# Patient Record
Sex: MALE
[De-identification: names, ages, dates, MRNs, and addresses within clinical notes are randomized per-mention and may not be internally consistent; named-entity substitution may affect disease eponyms.]

---

## 2018-03-10 ENCOUNTER — CHARTING TRANS (OUTPATIENT)
Dept: OTHER | Age: 68
End: 2018-03-10

## 2018-11-01 VITALS
TEMPERATURE: 97.7 F | DIASTOLIC BLOOD PRESSURE: 70 MMHG | SYSTOLIC BLOOD PRESSURE: 122 MMHG | OXYGEN SATURATION: 97 % | HEART RATE: 70 BPM | RESPIRATION RATE: 16 BRPM

## 2021-02-04 LAB
CHOLESTEROL: 140
HDL CHOLESTEROL: 58 MG/DL
LDL CHOLESTEROL: 68 MG/DL (ref ?–130)
TRIGLYCERIDES: 68 MG/DL

## 2021-03-25 ENCOUNTER — OFFICE VISIT (OUTPATIENT)
Dept: INTERNAL MEDICINE CLINIC | Facility: CLINIC | Age: 71
End: 2021-03-25
Payer: MEDICARE

## 2021-03-25 VITALS
HEART RATE: 89 BPM | SYSTOLIC BLOOD PRESSURE: 120 MMHG | OXYGEN SATURATION: 98 % | WEIGHT: 247.38 LBS | HEIGHT: 70.47 IN | BODY MASS INDEX: 35.02 KG/M2 | DIASTOLIC BLOOD PRESSURE: 78 MMHG

## 2021-03-25 DIAGNOSIS — I35.1 NONRHEUMATIC AORTIC VALVE INSUFFICIENCY: ICD-10-CM

## 2021-03-25 DIAGNOSIS — H61.23 BILATERAL IMPACTED CERUMEN: ICD-10-CM

## 2021-03-25 DIAGNOSIS — F41.9 ANXIETY: ICD-10-CM

## 2021-03-25 DIAGNOSIS — M1A.9XX0 CHRONIC GOUT WITHOUT TOPHUS, UNSPECIFIED CAUSE, UNSPECIFIED SITE: ICD-10-CM

## 2021-03-25 DIAGNOSIS — I71.2 THORACIC AORTIC ANEURYSM WITHOUT RUPTURE (HCC): Primary | ICD-10-CM

## 2021-03-25 PROBLEM — J38.3 VOCAL CORD DYSFUNCTION: Status: ACTIVE | Noted: 2019-10-30

## 2021-03-25 PROBLEM — M54.50 CHRONIC LOW BACK PAIN: Status: ACTIVE | Noted: 2018-04-18

## 2021-03-25 PROBLEM — I25.10 CORONARY ATHEROSCLEROSIS: Status: ACTIVE | Noted: 2019-01-28

## 2021-03-25 PROBLEM — J45.30 CONTROLLED MILD PERSISTENT ASTHMA: Status: ACTIVE | Noted: 2019-10-30

## 2021-03-25 PROBLEM — G89.29 CHRONIC LOW BACK PAIN: Status: ACTIVE | Noted: 2018-04-18

## 2021-03-25 PROBLEM — M10.9 GOUT: Status: ACTIVE | Noted: 2018-04-18

## 2021-03-25 PROBLEM — I71.20 THORACIC AORTIC ANEURYSM: Status: ACTIVE | Noted: 2019-01-28

## 2021-03-25 PROBLEM — J45.30: Status: ACTIVE | Noted: 2019-10-30

## 2021-03-25 PROBLEM — I65.29 CAROTID ARTERY STENOSIS: Status: ACTIVE | Noted: 2018-04-18

## 2021-03-25 PROBLEM — N40.0 BPH (BENIGN PROSTATIC HYPERPLASIA): Status: ACTIVE | Noted: 2018-04-18

## 2021-03-25 PROBLEM — I71.20 THORACIC AORTIC ANEURYSM (HCC): Status: ACTIVE | Noted: 2019-01-28

## 2021-03-25 PROCEDURE — 69209 REMOVE IMPACTED EAR WAX UNI: CPT | Performed by: INTERNAL MEDICINE

## 2021-03-25 PROCEDURE — 99214 OFFICE O/P EST MOD 30 MIN: CPT | Performed by: INTERNAL MEDICINE

## 2021-03-25 RX ORDER — ATORVASTATIN CALCIUM 20 MG/1
20 TABLET, FILM COATED ORAL DAILY
COMMUNITY
Start: 2020-12-31 | End: 2021-07-08

## 2021-03-25 RX ORDER — DIAZEPAM 5 MG/1
5 TABLET ORAL DAILY
COMMUNITY
Start: 2021-01-25 | End: 2021-07-08

## 2021-03-25 RX ORDER — ASPIRIN 81 MG/1
81 TABLET, CHEWABLE ORAL DAILY
COMMUNITY

## 2021-03-25 RX ORDER — DOXAZOSIN 8 MG/1
TABLET ORAL
COMMUNITY
Start: 2020-09-17 | End: 2021-03-25

## 2021-03-25 RX ORDER — ALBUTEROL SULFATE 90 UG/1
2 AEROSOL, METERED RESPIRATORY (INHALATION) EVERY 4 HOURS PRN
Qty: 1 INHALER | Refills: 6 | Status: SHIPPED | OUTPATIENT
Start: 2021-03-25 | End: 2021-07-27 | Stop reason: ALTCHOICE

## 2021-03-25 RX ORDER — ALLOPURINOL 300 MG/1
300 TABLET ORAL DAILY
COMMUNITY
Start: 2021-01-05 | End: 2021-10-18

## 2021-03-25 RX ORDER — AZELASTINE 1 MG/ML
1 SPRAY, METERED NASAL 2 TIMES DAILY
COMMUNITY
Start: 2020-11-19

## 2021-03-25 RX ORDER — DEXAMETHASONE 4 MG/1
TABLET ORAL
COMMUNITY
Start: 2021-03-19 | End: 2021-08-24 | Stop reason: ALTCHOICE

## 2021-03-25 NOTE — PROGRESS NOTES
Baron Forrest  78year old male  Patient presents with:  Ear Wax  Follow - Up: for cardiogram  .  Chief complaint is bilateral ear wax with decreased hearing aortic aneurysm, anxiety    HPI:       Is here for follow-up of multiple problems including ao are clear. He has bilateral cerumen impaction. On his left ear he has a scabbed lesion which was removed. He has a shallow ulceration. ASSESSMENT AND PLAN:   1.  Bilateral impacted cerumen  Ear wax was flushed successfully bilaterally.  - REMOVAL IMPA

## 2021-07-08 RX ORDER — DIAZEPAM 5 MG/1
5 TABLET ORAL DAILY
Qty: 30 TABLET | Refills: 3 | Status: SHIPPED | OUTPATIENT
Start: 2021-07-08 | End: 2022-01-21

## 2021-07-08 RX ORDER — ATORVASTATIN CALCIUM 20 MG/1
20 TABLET, FILM COATED ORAL DAILY
Qty: 90 TABLET | Refills: 0 | Status: SHIPPED | OUTPATIENT
Start: 2021-07-08 | End: 2021-10-11

## 2021-07-08 NOTE — TELEPHONE ENCOUNTER
Pt called requesting refills for diazepam 5 MG Oral Tab  atorvastatin 20 MG Oral Tab  Pt wants to be informed when this has been sent

## 2021-07-08 NOTE — TELEPHONE ENCOUNTER
Patient is requesting refills for atorvastatin and diazepam. I sent over refills for atorvastatin and routed diazepam to Dr. Danetta Gitelman.

## 2021-07-27 NOTE — PROGRESS NOTES
Fernando Mendez  78year old male  Patient presents with:  Ear Wax: right ear   Leg Pain: lag cramps at night (once in a while)  .           HPI:       21-year-old gentleman comes in complaining of leg cramps mostly in his calfs and thighs mostly at night use: Never     No Known Allergies   Alfuzosin HCl ER 10 MG Oral Tablet 24 Hr, Take 10 mg by mouth daily. , Disp: , Rfl:   escitalopram (LEXAPRO) 10 MG Oral Tab, Take 1 tablet (10 mg total) by mouth daily. , Disp: 90 tablet, Rfl: 2  atorvastatin 20 MG Oral Ta calcification noted. Other medical problems seems stable at the present time. He did have some dried wax in his ears which was not significant.     Visit lasted more than 30 minutes    This note was prepared using [employer] voice recognition dictat

## 2021-08-24 ENCOUNTER — OFFICE VISIT (OUTPATIENT)
Dept: INTERNAL MEDICINE CLINIC | Facility: CLINIC | Age: 71
End: 2021-08-24
Payer: MEDICARE

## 2021-08-24 VITALS
WEIGHT: 247.38 LBS | DIASTOLIC BLOOD PRESSURE: 70 MMHG | HEIGHT: 70.47 IN | TEMPERATURE: 98 F | OXYGEN SATURATION: 98 % | SYSTOLIC BLOOD PRESSURE: 130 MMHG | BODY MASS INDEX: 35.02 KG/M2 | HEART RATE: 75 BPM

## 2021-08-24 DIAGNOSIS — Z00.00 ENCOUNTER FOR ANNUAL HEALTH EXAMINATION: Primary | ICD-10-CM

## 2021-08-24 PROCEDURE — G0439 PPPS, SUBSEQ VISIT: HCPCS | Performed by: INTERNAL MEDICINE

## 2021-08-24 NOTE — PATIENT INSTRUCTIONS
Day Amin's SCREENING SCHEDULE   Tests on this list are recommended by your physician but may not be covered, or covered at this frequency, by your insurer. Please check with your insurance carrier before scheduling to verify coverage.    PREVEN PPSV23) due on 02/04/2022    Hepatitis B One screening covered for patients with certain risk factors   -  No recommendations at this time    Tetanus Toxoid Not covered by Medicare Part B unless medically necessary (cut with metal); may be covered with you need.  Screening Who needs it How often   Abdominal aortic aneurysm Men ages 72 to 76 who have ever smoked. Men in this age group who have never smoked could still be offered screening, depending on their family history or other risk factors they may have. born between [birth year] and 1963 At routine exams   High cholesterol or triglycerides All men in this age group At least every 5 years   HIV Men at increased risk for infection At routine exams   Lung cancer Men ages 54 to 76 who are in fairly good health and ar Men at increased risk for infection 1, 2, or 3 doses (depending on vaccine); ask your healthcare provider if you need a booster dose   Pneumococcal conjugate vaccine (PCV13) and pneumococcal polysaccharide vaccine (PPSV23) PPSV 23:  All men in this age [de-identified]

## 2021-08-24 NOTE — PROGRESS NOTES
HPI:   Kasie Church is a 73year old male who presents for a Medicare Subsequent Annual Wellness visit (Pt already had Initial Annual Wellness).       His last annual assessment has been over 1 year: Annual Physical due on 08/24/2022       uro Dr Nely Gan alcohol abuse.          Patient Care Team: Patient Care Team:  Aura Luis MD as PCP - General (Internal Medicine)    Patient Active Problem List:     Lumbar spondylosis     DDD (degenerative disc disease), lumbar     Aortic insufficiency     BPH (jack He has history of gout but that has not been bothering him. His BPH is stable. He has no chest pains or shortness of breath. No GI issues his last colonoscopy was about 8 years ago.   He has no polyps    EXAM:   /70 (BP Location: Right arm, Patient Balaji Bee MD, 8/24/2021     General Health     In the past six months, have you lost more than 10 pounds without trying?: 2 - No  Has your appetite been poor?: No  How does the patient maintain a good energy level?: Appropriate Exercise  How woul 12/05/2013    Colonoscopy due on 12/05/2023    Flexible Sigmoidoscopy   Covered every 4 years -    Fecal Occult Blood Test Covered annually -   Prostate Cancer Screening    Prostate-Specific Antigen (PSA) Annually No results found for: PSA  PSA Never done

## 2021-10-11 RX ORDER — ATORVASTATIN CALCIUM 20 MG/1
TABLET, FILM COATED ORAL
Qty: 90 TABLET | Refills: 0 | Status: SHIPPED | OUTPATIENT
Start: 2021-10-11 | End: 2021-11-23

## 2021-10-18 ENCOUNTER — TELEPHONE (OUTPATIENT)
Dept: INTERNAL MEDICINE CLINIC | Facility: CLINIC | Age: 71
End: 2021-10-18

## 2021-10-18 RX ORDER — ALLOPURINOL 300 MG/1
300 TABLET ORAL DAILY
Qty: 90 TABLET | Refills: 0 | Status: SHIPPED | OUTPATIENT
Start: 2021-10-18 | End: 2022-01-05

## 2021-10-18 NOTE — TELEPHONE ENCOUNTER
Patient is calling for a medication refill on the following: allopurinol 300 MG Oral Tab. Please advise.

## 2021-10-18 NOTE — TELEPHONE ENCOUNTER
Called patient in regard to refill request. Refill was sent to the pharmacy and patient was notified.

## 2021-11-23 ENCOUNTER — OFFICE VISIT (OUTPATIENT)
Dept: INTERNAL MEDICINE CLINIC | Facility: CLINIC | Age: 71
End: 2021-11-23
Payer: MEDICARE

## 2021-11-23 VITALS
OXYGEN SATURATION: 97 % | DIASTOLIC BLOOD PRESSURE: 80 MMHG | WEIGHT: 236.81 LBS | HEART RATE: 84 BPM | TEMPERATURE: 97 F | SYSTOLIC BLOOD PRESSURE: 132 MMHG | HEIGHT: 70.47 IN | BODY MASS INDEX: 33.53 KG/M2

## 2021-11-23 DIAGNOSIS — Z98.890 HISTORY OF CARDIAC RADIOFREQUENCY ABLATION: ICD-10-CM

## 2021-11-23 DIAGNOSIS — J45.20 MILD INTERMITTENT ASTHMA WITHOUT COMPLICATION: ICD-10-CM

## 2021-11-23 DIAGNOSIS — N40.0 BENIGN PROSTATIC HYPERPLASIA, UNSPECIFIED WHETHER LOWER URINARY TRACT SYMPTOMS PRESENT: ICD-10-CM

## 2021-11-23 DIAGNOSIS — I71.2 THORACIC AORTIC ANEURYSM WITHOUT RUPTURE (HCC): Primary | ICD-10-CM

## 2021-11-23 DIAGNOSIS — I35.1 NONRHEUMATIC AORTIC VALVE INSUFFICIENCY: ICD-10-CM

## 2021-11-23 DIAGNOSIS — E78.5 DYSLIPIDEMIA: ICD-10-CM

## 2021-11-23 DIAGNOSIS — Z86.79 PERSONAL HISTORY OF ATRIAL FLUTTER: ICD-10-CM

## 2021-11-23 DIAGNOSIS — M10.00 IDIOPATHIC GOUT, UNSPECIFIED CHRONICITY, UNSPECIFIED SITE: ICD-10-CM

## 2021-11-23 DIAGNOSIS — Z12.5 PROSTATE CANCER SCREENING: ICD-10-CM

## 2021-11-23 DIAGNOSIS — M1A.9XX0 CHRONIC GOUT WITHOUT TOPHUS, UNSPECIFIED CAUSE, UNSPECIFIED SITE: ICD-10-CM

## 2021-11-23 PROBLEM — J45.30 CONTROLLED MILD PERSISTENT ASTHMA: Status: RESOLVED | Noted: 2019-10-30 | Resolved: 2021-11-23

## 2021-11-23 PROBLEM — J45.30: Status: RESOLVED | Noted: 2019-10-30 | Resolved: 2021-11-23

## 2021-11-23 PROCEDURE — 99214 OFFICE O/P EST MOD 30 MIN: CPT | Performed by: INTERNAL MEDICINE

## 2021-11-23 RX ORDER — ATORVASTATIN CALCIUM 40 MG/1
40 TABLET, FILM COATED ORAL DAILY
Qty: 90 TABLET | Refills: 3 | Status: SHIPPED | OUTPATIENT
Start: 2021-11-23

## 2021-11-23 RX ORDER — ALBUTEROL SULFATE 90 UG/1
2 AEROSOL, METERED RESPIRATORY (INHALATION) EVERY 4 HOURS PRN
COMMUNITY
Start: 2021-11-13

## 2021-11-23 NOTE — PROGRESS NOTES
Shade Carroll  73year old male  Patient presents with: Follow - Up  .     Thoracic aortic aneurysm, aortic insufficiency mild intermittent asthma      HPI:       Chucho Roberts has moderate aortic insufficiency and a 4.3 cm ascending thoracic aneurysm aortic v 4 (four) hours as needed. , Disp: , Rfl:   atorvastatin 40 MG Oral Tab, Take 1 tablet (40 mg total) by mouth daily. , Disp: 90 tablet, Rfl: 3  allopurinol 300 MG Oral Tab, Take 1 tablet (300 mg total) by mouth daily. , Disp: 90 tablet, Rfl: 0  diazepam 5 MG O including PSA uric acid and cholesterol. Visit lasted more than 30 minutes    This note was prepared using [employer] voice recognition dictation software and as a result, errors may occur. When identified, these errors have been corrected.  While ev

## 2021-12-28 ENCOUNTER — OFFICE VISIT (OUTPATIENT)
Dept: INTERNAL MEDICINE CLINIC | Facility: CLINIC | Age: 71
End: 2021-12-28
Payer: MEDICARE

## 2021-12-28 VITALS
TEMPERATURE: 97 F | BODY MASS INDEX: 33.13 KG/M2 | OXYGEN SATURATION: 98 % | HEIGHT: 70.47 IN | HEART RATE: 72 BPM | DIASTOLIC BLOOD PRESSURE: 78 MMHG | SYSTOLIC BLOOD PRESSURE: 132 MMHG | WEIGHT: 234 LBS

## 2021-12-28 DIAGNOSIS — I25.10 ATHEROSCLEROSIS OF NATIVE CORONARY ARTERY OF NATIVE HEART WITHOUT ANGINA PECTORIS: ICD-10-CM

## 2021-12-28 DIAGNOSIS — M75.42 IMPINGEMENT SYNDROME OF LEFT SHOULDER: Primary | ICD-10-CM

## 2021-12-28 PROCEDURE — 99213 OFFICE O/P EST LOW 20 MIN: CPT | Performed by: INTERNAL MEDICINE

## 2021-12-28 NOTE — PROGRESS NOTES
Fernando Villalba  73year old male  Patient presents with:  Shoulder Pain: left shoulder pain x2 weeks  . HPI:       Daly Agrawal is got some left shoulder pain for the last 2 weeks he was lifting some weights when the problem began.   He has had signifi (300 mg total) by mouth daily. , Disp: 90 tablet, Rfl: 0  diazepam 5 MG Oral Tab, Take 1 tablet (5 mg total) by mouth daily. , Disp: 30 tablet, Rfl: 3  aspirin 81 MG Oral Chew Tab, Chew 81 mg by mouth daily. , Disp: , Rfl:   Azelastine HCl 0.1 % Nasal Solutio

## 2022-01-05 RX ORDER — ALLOPURINOL 300 MG/1
TABLET ORAL
Qty: 90 TABLET | Refills: 0 | Status: SHIPPED | OUTPATIENT
Start: 2022-01-05

## 2022-01-05 RX ORDER — ATORVASTATIN CALCIUM 20 MG/1
TABLET, FILM COATED ORAL
Qty: 90 TABLET | Refills: 0 | OUTPATIENT
Start: 2022-01-05

## 2022-01-21 RX ORDER — DIAZEPAM 5 MG/1
TABLET ORAL
Qty: 30 TABLET | Refills: 3 | Status: SHIPPED | OUTPATIENT
Start: 2022-01-21

## 2022-01-21 NOTE — TELEPHONE ENCOUNTER
Requested Prescriptions     Pending Prescriptions Disp Refills   • DIAZEPAM 5 MG Oral Tab [Pharmacy Med Name: DIAZEPAM 5MG TABLETS] 30 tablet 0     Sig: TAKE 1 TABLET(5 MG) BY MOUTH DAILY       Last appointment was 12/28/2021    No pending appointment sche

## 2022-04-04 ENCOUNTER — HOSPITAL ENCOUNTER (OUTPATIENT)
Dept: MRI IMAGING | Age: 72
Discharge: HOME | End: 2022-04-04
Attending: INTERNAL MEDICINE
Payer: MEDICARE

## 2022-04-04 DIAGNOSIS — M75.42 IMPINGEMENT SYNDROME OF LEFT SHOULDER: ICD-10-CM

## 2022-04-04 PROCEDURE — 73221 MRI JOINT UPR EXTREM W/O DYE: CPT | Performed by: INTERNAL MEDICINE

## 2022-04-04 RX ORDER — ALLOPURINOL 300 MG/1
TABLET ORAL
Qty: 90 TABLET | Refills: 0 | Status: SHIPPED | OUTPATIENT
Start: 2022-04-04

## 2022-04-05 ENCOUNTER — OFFICE VISIT (OUTPATIENT)
Dept: INTERNAL MEDICINE CLINIC | Facility: CLINIC | Age: 72
End: 2022-04-05
Payer: MEDICARE

## 2022-04-05 VITALS
WEIGHT: 240 LBS | DIASTOLIC BLOOD PRESSURE: 70 MMHG | HEIGHT: 70.47 IN | BODY MASS INDEX: 33.98 KG/M2 | HEART RATE: 82 BPM | OXYGEN SATURATION: 98 % | SYSTOLIC BLOOD PRESSURE: 116 MMHG | TEMPERATURE: 98 F

## 2022-04-05 DIAGNOSIS — M75.82 TENDINITIS OF LEFT ROTATOR CUFF: Primary | ICD-10-CM

## 2022-04-05 DIAGNOSIS — I71.2 THORACIC AORTIC ANEURYSM WITHOUT RUPTURE (HCC): ICD-10-CM

## 2022-04-05 DIAGNOSIS — M1A.9XX0 CHRONIC GOUT WITHOUT TOPHUS, UNSPECIFIED CAUSE, UNSPECIFIED SITE: ICD-10-CM

## 2022-04-05 DIAGNOSIS — M79.10 MYALGIA: ICD-10-CM

## 2022-04-05 DIAGNOSIS — E78.5 DYSLIPIDEMIA: ICD-10-CM

## 2022-04-05 DIAGNOSIS — U07.1 COVID-19 VIRUS INFECTION: ICD-10-CM

## 2022-04-05 PROCEDURE — 99213 OFFICE O/P EST LOW 20 MIN: CPT | Performed by: INTERNAL MEDICINE

## 2022-04-05 RX ORDER — DOXAZOSIN 8 MG/1
8 TABLET ORAL NIGHTLY
Qty: 90 TABLET | Refills: 3 | Status: SHIPPED | OUTPATIENT
Start: 2022-04-05

## 2022-04-08 LAB
ALBUMIN/GLOBULIN RATIO: 1.4 (CALC) (ref 1–2.5)
ALBUMIN: 4.3 G/DL (ref 3.6–5.1)
ALKALINE PHOSPHATASE: 65 U/L (ref 35–144)
ALT: 35 U/L (ref 9–46)
AST: 35 U/L (ref 10–35)
BILIRUBIN, TOTAL: 1 MG/DL (ref 0.2–1.2)
BUN: 19 MG/DL (ref 7–25)
CALCIUM: 9.6 MG/DL (ref 8.6–10.3)
CARBON DIOXIDE: 30 MMOL/L (ref 20–32)
CHLORIDE: 103 MMOL/L (ref 98–110)
CREATININE: 0.97 MG/DL (ref 0.7–1.25)
EGFR IF AFRICN AM: 92 ML/MIN/1.73M2
EGFR IF NONAFRICN AM: 79 ML/MIN/1.73M2
GLOBULIN: 3.1 G/DL (CALC) (ref 1.9–3.7)
GLUCOSE: 86 MG/DL (ref 65–99)
HEMATOCRIT: 47.5 % (ref 38.5–50)
HEMOGLOBIN: 16 G/DL (ref 13.2–17.1)
MCH: 29.7 PG (ref 27–33)
MCHC: 33.7 G/DL (ref 32–36)
MCV: 88.1 FL (ref 80–100)
MPV: 12.1 FL (ref 7.5–12.5)
PLATELET COUNT: 156 THOUSAND/UL (ref 140–400)
POTASSIUM: 4.7 MMOL/L (ref 3.5–5.3)
PROTEIN, TOTAL: 7.4 G/DL (ref 6.1–8.1)
RDW: 14 % (ref 11–15)
RED BLOOD CELL COUNT: 5.39 MILLION/UL (ref 4.2–5.8)
SARS COV 2 AB IGG: POSITIVE
SED RATE BY MODIFIED$WESTERGREN: 9 MM/H
SODIUM: 140 MMOL/L (ref 135–146)
TSH W/REFLEX TO FT4: 1.29 MIU/L (ref 0.4–4.5)
WHITE BLOOD CELL COUNT: 5.3 THOUSAND/UL (ref 3.8–10.8)

## 2022-05-05 ENCOUNTER — HOSPITAL ENCOUNTER (OUTPATIENT)
Dept: CT IMAGING | Facility: HOSPITAL | Age: 72
Discharge: HOME | End: 2022-05-05
Attending: INTERNAL MEDICINE
Payer: MEDICARE

## 2022-05-05 DIAGNOSIS — I71.2 THORACIC AORTIC ANEURYSM WITHOUT RUPTURE (HCC): ICD-10-CM

## 2022-05-05 PROCEDURE — 71260 CT THORAX DX C+: CPT | Performed by: INTERNAL MEDICINE

## 2022-06-06 LAB — AMB EXT PSA SCREEN: 0.79 NG/ML

## 2022-06-13 RX ORDER — ALLOPURINOL 300 MG/1
TABLET ORAL
Qty: 90 TABLET | Refills: 0 | Status: SHIPPED | OUTPATIENT
Start: 2022-06-13

## 2022-08-07 RX ORDER — DIAZEPAM 5 MG/1
5 TABLET ORAL DAILY
Qty: 30 TABLET | Refills: 2 | Status: SHIPPED | OUTPATIENT
Start: 2022-08-07 | End: 2023-04-03

## 2022-09-13 ENCOUNTER — OFFICE VISIT (OUTPATIENT)
Dept: INTERNAL MEDICINE CLINIC | Facility: CLINIC | Age: 72
End: 2022-09-13
Payer: MEDICARE

## 2022-09-13 VITALS
TEMPERATURE: 97 F | DIASTOLIC BLOOD PRESSURE: 60 MMHG | OXYGEN SATURATION: 96 % | HEART RATE: 76 BPM | SYSTOLIC BLOOD PRESSURE: 98 MMHG | WEIGHT: 237 LBS | HEIGHT: 70 IN | BODY MASS INDEX: 33.93 KG/M2

## 2022-09-13 DIAGNOSIS — I71.2 THORACIC AORTIC ANEURYSM WITHOUT RUPTURE (HCC): ICD-10-CM

## 2022-09-13 DIAGNOSIS — Z98.890 HISTORY OF CARDIAC RADIOFREQUENCY ABLATION: ICD-10-CM

## 2022-09-13 DIAGNOSIS — M1A.9XX0 CHRONIC GOUT WITHOUT TOPHUS, UNSPECIFIED CAUSE, UNSPECIFIED SITE: ICD-10-CM

## 2022-09-13 DIAGNOSIS — Z86.79 PERSONAL HISTORY OF ATRIAL FLUTTER: ICD-10-CM

## 2022-09-13 DIAGNOSIS — J45.20 MILD INTERMITTENT ASTHMA WITHOUT COMPLICATION: ICD-10-CM

## 2022-09-13 DIAGNOSIS — N40.0 BENIGN PROSTATIC HYPERPLASIA, UNSPECIFIED WHETHER LOWER URINARY TRACT SYMPTOMS PRESENT: ICD-10-CM

## 2022-09-13 DIAGNOSIS — I65.23 BILATERAL CAROTID ARTERY STENOSIS: ICD-10-CM

## 2022-09-13 DIAGNOSIS — R25.2 LEG CRAMPS: ICD-10-CM

## 2022-09-13 DIAGNOSIS — I25.10 ATHEROSCLEROSIS OF NATIVE CORONARY ARTERY OF NATIVE HEART WITHOUT ANGINA PECTORIS: ICD-10-CM

## 2022-09-13 DIAGNOSIS — E78.5 DYSLIPIDEMIA: Primary | ICD-10-CM

## 2022-09-13 DIAGNOSIS — I35.1 NONRHEUMATIC AORTIC VALVE INSUFFICIENCY: ICD-10-CM

## 2022-09-13 PROBLEM — J38.3 VOCAL CORD DYSFUNCTION: Status: RESOLVED | Noted: 2019-10-30 | Resolved: 2022-09-13

## 2022-09-13 LAB
ALBUMIN SERPL-MCNC: 3.3 G/DL (ref 3.4–5)
ALBUMIN/GLOB SERPL: 0.9 {RATIO} (ref 1–2)
ALP LIVER SERPL-CCNC: 62 U/L
ALT SERPL-CCNC: 37 U/L
ANION GAP SERPL CALC-SCNC: 6 MMOL/L (ref 0–18)
AST SERPL-CCNC: 32 U/L (ref 15–37)
BASOPHILS # BLD AUTO: 0.02 X10(3) UL (ref 0–0.2)
BASOPHILS NFR BLD AUTO: 0.4 %
BILIRUB SERPL-MCNC: 0.7 MG/DL (ref 0.1–2)
BUN BLD-MCNC: 26 MG/DL (ref 7–18)
BUN/CREAT SERPL: 28.6 (ref 10–20)
CALCIUM BLD-MCNC: 8.6 MG/DL (ref 8.5–10.1)
CHLORIDE SERPL-SCNC: 105 MMOL/L (ref 98–112)
CHOLEST SERPL-MCNC: 204 MG/DL (ref ?–200)
CO2 SERPL-SCNC: 28 MMOL/L (ref 21–32)
CREAT BLD-MCNC: 0.91 MG/DL
DEPRECATED RDW RBC AUTO: 53.1 FL (ref 35.1–46.3)
EOSINOPHIL # BLD AUTO: 0.09 X10(3) UL (ref 0–0.7)
EOSINOPHIL NFR BLD AUTO: 1.8 %
ERYTHROCYTE [DISTWIDTH] IN BLOOD BY AUTOMATED COUNT: 15.2 % (ref 11–15)
FASTING PATIENT LIPID ANSWER: NO
FASTING STATUS PATIENT QL REPORTED: NO
GFR SERPLBLD BASED ON 1.73 SQ M-ARVRAT: 91 ML/MIN/1.73M2 (ref 60–?)
GLOBULIN PLAS-MCNC: 3.6 G/DL (ref 2.8–4.4)
GLUCOSE BLD-MCNC: 78 MG/DL (ref 70–99)
HCT VFR BLD AUTO: 44.5 %
HDLC SERPL-MCNC: 70 MG/DL (ref 40–59)
HGB BLD-MCNC: 14.8 G/DL
IMM GRANULOCYTES # BLD AUTO: 0.01 X10(3) UL (ref 0–1)
IMM GRANULOCYTES NFR BLD: 0.2 %
LDLC SERPL CALC-MCNC: 107 MG/DL (ref ?–100)
LYMPHOCYTES # BLD AUTO: 1.7 X10(3) UL (ref 1–4)
LYMPHOCYTES NFR BLD AUTO: 34.1 %
MCH RBC QN AUTO: 32 PG (ref 26–34)
MCHC RBC AUTO-ENTMCNC: 33.3 G/DL (ref 31–37)
MCV RBC AUTO: 96.3 FL
MONOCYTES # BLD AUTO: 0.53 X10(3) UL (ref 0.1–1)
MONOCYTES NFR BLD AUTO: 10.6 %
NEUTROPHILS # BLD AUTO: 2.63 X10 (3) UL (ref 1.5–7.7)
NEUTROPHILS # BLD AUTO: 2.63 X10(3) UL (ref 1.5–7.7)
NEUTROPHILS NFR BLD AUTO: 52.9 %
NONHDLC SERPL-MCNC: 134 MG/DL (ref ?–130)
OSMOLALITY SERPL CALC.SUM OF ELEC: 292 MOSM/KG (ref 275–295)
PLATELET # BLD AUTO: 176 10(3)UL (ref 150–450)
POTASSIUM SERPL-SCNC: 3.7 MMOL/L (ref 3.5–5.1)
PROT SERPL-MCNC: 6.9 G/DL (ref 6.4–8.2)
RBC # BLD AUTO: 4.62 X10(6)UL
SODIUM SERPL-SCNC: 139 MMOL/L (ref 136–145)
TRIGL SERPL-MCNC: 160 MG/DL (ref 30–149)
URATE SERPL-MCNC: 5 MG/DL
VLDLC SERPL CALC-MCNC: 27 MG/DL (ref 0–30)
WBC # BLD AUTO: 5 X10(3) UL (ref 4–11)

## 2022-09-13 PROCEDURE — 80061 LIPID PANEL: CPT | Performed by: INTERNAL MEDICINE

## 2022-09-13 PROCEDURE — 80053 COMPREHEN METABOLIC PANEL: CPT | Performed by: INTERNAL MEDICINE

## 2022-09-13 PROCEDURE — 85025 COMPLETE CBC W/AUTO DIFF WBC: CPT | Performed by: INTERNAL MEDICINE

## 2022-09-13 PROCEDURE — 84550 ASSAY OF BLOOD/URIC ACID: CPT | Performed by: INTERNAL MEDICINE

## 2022-09-13 RX ORDER — DOXAZOSIN 8 MG/1
12 TABLET ORAL NIGHTLY
Qty: 135 TABLET | Refills: 3 | Status: SHIPPED | OUTPATIENT
Start: 2022-09-13

## 2022-09-13 RX ORDER — AZELASTINE 1 MG/ML
1 SPRAY, METERED NASAL 2 TIMES DAILY
Qty: 30 ML | Refills: 2 | Status: SHIPPED | OUTPATIENT
Start: 2022-09-13

## 2022-09-13 RX ORDER — PRAVASTATIN SODIUM 40 MG
40 TABLET ORAL NIGHTLY
Qty: 90 TABLET | Refills: 3 | Status: SHIPPED | OUTPATIENT
Start: 2022-09-13

## 2022-09-13 RX ORDER — ALBUTEROL SULFATE 90 UG/1
2 AEROSOL, METERED RESPIRATORY (INHALATION) EVERY 4 HOURS PRN
Qty: 1 EACH | Refills: 1 | Status: SHIPPED | OUTPATIENT
Start: 2022-09-13

## 2022-09-13 RX ORDER — ALLOPURINOL 300 MG/1
300 TABLET ORAL DAILY
Qty: 90 TABLET | Refills: 3 | Status: SHIPPED | OUTPATIENT
Start: 2022-09-13

## 2022-11-02 ENCOUNTER — OFFICE VISIT (OUTPATIENT)
Dept: INTERNAL MEDICINE CLINIC | Facility: CLINIC | Age: 72
End: 2022-11-02
Payer: MEDICARE

## 2022-11-02 VITALS
OXYGEN SATURATION: 99 % | DIASTOLIC BLOOD PRESSURE: 78 MMHG | TEMPERATURE: 97 F | WEIGHT: 238 LBS | SYSTOLIC BLOOD PRESSURE: 130 MMHG | BODY MASS INDEX: 34 KG/M2 | HEART RATE: 88 BPM

## 2022-11-02 DIAGNOSIS — T50.Z95A ARTHRALGIA AFTER VACCINATION: ICD-10-CM

## 2022-11-02 DIAGNOSIS — Z86.79 PERSONAL HISTORY OF ATRIAL FLUTTER: ICD-10-CM

## 2022-11-02 DIAGNOSIS — N40.1 BENIGN PROSTATIC HYPERPLASIA WITH NOCTURIA: ICD-10-CM

## 2022-11-02 DIAGNOSIS — I65.23 BILATERAL CAROTID ARTERY STENOSIS: ICD-10-CM

## 2022-11-02 DIAGNOSIS — J45.20 MILD INTERMITTENT ASTHMA WITHOUT COMPLICATION: ICD-10-CM

## 2022-11-02 DIAGNOSIS — Z98.890 HISTORY OF CARDIAC RADIOFREQUENCY ABLATION: ICD-10-CM

## 2022-11-02 DIAGNOSIS — R31.0 GROSS HEMATURIA: Primary | ICD-10-CM

## 2022-11-02 DIAGNOSIS — R35.1 BENIGN PROSTATIC HYPERPLASIA WITH NOCTURIA: ICD-10-CM

## 2022-11-02 DIAGNOSIS — I35.1 NONRHEUMATIC AORTIC VALVE INSUFFICIENCY: ICD-10-CM

## 2022-11-02 DIAGNOSIS — M25.50 ARTHRALGIA AFTER VACCINATION: ICD-10-CM

## 2022-11-02 PROCEDURE — 99214 OFFICE O/P EST MOD 30 MIN: CPT | Performed by: INTERNAL MEDICINE

## 2022-11-02 RX ORDER — SILDENAFIL CITRATE 20 MG/1
TABLET ORAL
COMMUNITY
Start: 2022-09-14

## 2023-02-02 ENCOUNTER — OFFICE VISIT (OUTPATIENT)
Dept: INTERNAL MEDICINE CLINIC | Facility: CLINIC | Age: 73
End: 2023-02-02
Payer: MEDICARE

## 2023-02-02 VITALS
BODY MASS INDEX: 33.36 KG/M2 | DIASTOLIC BLOOD PRESSURE: 68 MMHG | HEART RATE: 82 BPM | HEIGHT: 70 IN | OXYGEN SATURATION: 95 % | SYSTOLIC BLOOD PRESSURE: 118 MMHG | WEIGHT: 233 LBS

## 2023-02-02 DIAGNOSIS — N40.1 BENIGN PROSTATIC HYPERPLASIA WITH NOCTURIA: ICD-10-CM

## 2023-02-02 DIAGNOSIS — R31.0 GROSS HEMATURIA: Primary | ICD-10-CM

## 2023-02-02 DIAGNOSIS — H61.20 CERUMEN IN AUDITORY CANAL ON EXAMINATION: ICD-10-CM

## 2023-02-02 DIAGNOSIS — F41.9 ANXIETY: ICD-10-CM

## 2023-02-02 DIAGNOSIS — M10.00 IDIOPATHIC GOUT, UNSPECIFIED CHRONICITY, UNSPECIFIED SITE: ICD-10-CM

## 2023-02-02 DIAGNOSIS — R35.1 BENIGN PROSTATIC HYPERPLASIA WITH NOCTURIA: ICD-10-CM

## 2023-02-02 DIAGNOSIS — M25.511 CHRONIC RIGHT SHOULDER PAIN: ICD-10-CM

## 2023-02-02 DIAGNOSIS — G89.29 CHRONIC RIGHT SHOULDER PAIN: ICD-10-CM

## 2023-02-02 DIAGNOSIS — J45.20 MILD INTERMITTENT ASTHMA WITHOUT COMPLICATION: ICD-10-CM

## 2023-02-02 PROCEDURE — 99214 OFFICE O/P EST MOD 30 MIN: CPT | Performed by: INTERNAL MEDICINE

## 2023-03-10 ENCOUNTER — TELEPHONE (OUTPATIENT)
Dept: INTERNAL MEDICINE CLINIC | Facility: CLINIC | Age: 73
End: 2023-03-10

## 2023-03-10 DIAGNOSIS — G89.29 CHRONIC RIGHT SHOULDER PAIN: ICD-10-CM

## 2023-03-10 DIAGNOSIS — M25.511 ACUTE PAIN OF RIGHT SHOULDER: Primary | ICD-10-CM

## 2023-03-10 DIAGNOSIS — M25.511 CHRONIC RIGHT SHOULDER PAIN: ICD-10-CM

## 2023-03-10 NOTE — TELEPHONE ENCOUNTER
To Dr. Kaitlyn Keenan--    RN noted R shoulder pain was assessed at [address] [place] [address] in feb. Order pended if MD agreeable.

## 2023-03-10 NOTE — TELEPHONE ENCOUNTER
Pt is calling requesting a mri order for right shoulder. Pt states has been having pain. Pt at first thought he had it done before but mri was for left shoulder.        Please call and advise

## 2023-03-10 NOTE — TELEPHONE ENCOUNTER
MRI not covered with diagnosis of acute or chronic pain. Recommend to see orthopedics so he does not end up with a big bill.

## 2023-06-02 ENCOUNTER — OFFICE VISIT (OUTPATIENT)
Dept: INTERNAL MEDICINE CLINIC | Facility: CLINIC | Age: 73
End: 2023-06-02
Payer: MEDICARE

## 2023-06-02 VITALS
OXYGEN SATURATION: 96 % | SYSTOLIC BLOOD PRESSURE: 116 MMHG | WEIGHT: 242 LBS | HEART RATE: 89 BPM | DIASTOLIC BLOOD PRESSURE: 78 MMHG | TEMPERATURE: 98 F | BODY MASS INDEX: 35 KG/M2

## 2023-06-02 DIAGNOSIS — G89.29 CHRONIC LEFT SHOULDER PAIN: ICD-10-CM

## 2023-06-02 DIAGNOSIS — E78.5 DYSLIPIDEMIA: ICD-10-CM

## 2023-06-02 DIAGNOSIS — R35.1 BENIGN PROSTATIC HYPERPLASIA WITH NOCTURIA: ICD-10-CM

## 2023-06-02 DIAGNOSIS — R53.83 FATIGUE, UNSPECIFIED TYPE: ICD-10-CM

## 2023-06-02 DIAGNOSIS — M25.512 CHRONIC LEFT SHOULDER PAIN: ICD-10-CM

## 2023-06-02 DIAGNOSIS — M10.00 IDIOPATHIC GOUT, UNSPECIFIED CHRONICITY, UNSPECIFIED SITE: ICD-10-CM

## 2023-06-02 DIAGNOSIS — Z12.5 PROSTATE CANCER SCREENING: ICD-10-CM

## 2023-06-02 DIAGNOSIS — N40.1 BENIGN PROSTATIC HYPERPLASIA WITH NOCTURIA: ICD-10-CM

## 2023-06-02 DIAGNOSIS — Z01.818 PRE-OPERATIVE CLEARANCE: Primary | ICD-10-CM

## 2023-06-02 PROCEDURE — 99214 OFFICE O/P EST MOD 30 MIN: CPT | Performed by: INTERNAL MEDICINE

## 2023-06-03 PROBLEM — I25.10 CORONARY ATHEROSCLEROSIS: Status: RESOLVED | Noted: 2019-01-28 | Resolved: 2023-06-03

## 2023-06-06 ENCOUNTER — LAB ENCOUNTER (OUTPATIENT)
Dept: LAB | Facility: HOSPITAL | Age: 73
End: 2023-06-06
Attending: INTERNAL MEDICINE
Payer: MEDICARE

## 2023-06-06 DIAGNOSIS — G89.29 OTHER CHRONIC PAIN: ICD-10-CM

## 2023-06-06 DIAGNOSIS — M25.512 CHRONIC LEFT SHOULDER PAIN: ICD-10-CM

## 2023-06-06 DIAGNOSIS — Z01.818 PREOPERATIVE EXAMINATION, UNSPECIFIED: Primary | ICD-10-CM

## 2023-06-06 DIAGNOSIS — G89.29 CHRONIC LEFT SHOULDER PAIN: ICD-10-CM

## 2023-06-06 DIAGNOSIS — M25.512 LEFT SHOULDER PAIN: ICD-10-CM

## 2023-06-06 DIAGNOSIS — R53.83 FATIGUE, UNSPECIFIED TYPE: ICD-10-CM

## 2023-06-06 LAB
ALBUMIN SERPL-MCNC: 3.7 G/DL (ref 3.4–5)
ALBUMIN/GLOB SERPL: 0.9 {RATIO} (ref 1–2)
ALP LIVER SERPL-CCNC: 63 U/L
ALT SERPL-CCNC: 32 U/L
ANION GAP SERPL CALC-SCNC: 0 MMOL/L (ref 0–18)
AST SERPL-CCNC: 36 U/L (ref 15–37)
ATRIAL RATE: 66 BPM
BASOPHILS # BLD AUTO: 0.03 X10(3) UL (ref 0–0.2)
BASOPHILS NFR BLD AUTO: 0.6 %
BILIRUB SERPL-MCNC: 0.9 MG/DL (ref 0.1–2)
BILIRUB UR QL: NEGATIVE
BUN BLD-MCNC: 18 MG/DL (ref 7–18)
BUN/CREAT SERPL: 18.6 (ref 10–20)
CALCIUM BLD-MCNC: 9 MG/DL (ref 8.5–10.1)
CHLORIDE SERPL-SCNC: 107 MMOL/L (ref 98–112)
CHOLEST SERPL-MCNC: 191 MG/DL (ref ?–200)
CLARITY UR: CLEAR
CO2 SERPL-SCNC: 30 MMOL/L (ref 21–32)
COMPLEXED PSA SERPL-MCNC: 0.85 NG/ML (ref ?–4)
CREAT BLD-MCNC: 0.97 MG/DL
DEPRECATED RDW RBC AUTO: 47.1 FL (ref 35.1–46.3)
EOSINOPHIL # BLD AUTO: 0.34 X10(3) UL (ref 0–0.7)
EOSINOPHIL NFR BLD AUTO: 7 %
ERYTHROCYTE [DISTWIDTH] IN BLOOD BY AUTOMATED COUNT: 14 % (ref 11–15)
FASTING PATIENT LIPID ANSWER: YES
FASTING STATUS PATIENT QL REPORTED: YES
GFR SERPLBLD BASED ON 1.73 SQ M-ARVRAT: 84 ML/MIN/1.73M2 (ref 60–?)
GLOBULIN PLAS-MCNC: 4.3 G/DL (ref 2.8–4.4)
GLUCOSE BLD-MCNC: 98 MG/DL (ref 70–99)
GLUCOSE UR-MCNC: NORMAL MG/DL
HCT VFR BLD AUTO: 48.4 %
HDLC SERPL-MCNC: 65 MG/DL (ref 40–59)
HGB BLD-MCNC: 16.2 G/DL
HGB UR QL STRIP.AUTO: NEGATIVE
IMM GRANULOCYTES # BLD AUTO: 0.01 X10(3) UL (ref 0–1)
IMM GRANULOCYTES NFR BLD: 0.2 %
KETONES UR-MCNC: NEGATIVE MG/DL
LDLC SERPL CALC-MCNC: 115 MG/DL (ref ?–100)
LEUKOCYTE ESTERASE UR QL STRIP.AUTO: NEGATIVE
LYMPHOCYTES # BLD AUTO: 2.02 X10(3) UL (ref 1–4)
LYMPHOCYTES NFR BLD AUTO: 41.6 %
MCH RBC QN AUTO: 31 PG (ref 26–34)
MCHC RBC AUTO-ENTMCNC: 33.5 G/DL (ref 31–37)
MCV RBC AUTO: 92.7 FL
MONOCYTES # BLD AUTO: 0.53 X10(3) UL (ref 0.1–1)
MONOCYTES NFR BLD AUTO: 10.9 %
NEUTROPHILS # BLD AUTO: 1.93 X10 (3) UL (ref 1.5–7.7)
NEUTROPHILS # BLD AUTO: 1.93 X10(3) UL (ref 1.5–7.7)
NEUTROPHILS NFR BLD AUTO: 39.7 %
NITRITE UR QL STRIP.AUTO: NEGATIVE
NONHDLC SERPL-MCNC: 126 MG/DL (ref ?–130)
OSMOLALITY SERPL CALC.SUM OF ELEC: 286 MOSM/KG (ref 275–295)
P AXIS: 13 DEGREES
P-R INTERVAL: 132 MS
PH UR: 6.5 [PH] (ref 5–8)
PLATELET # BLD AUTO: 177 10(3)UL (ref 150–450)
POTASSIUM SERPL-SCNC: 4 MMOL/L (ref 3.5–5.1)
PROT SERPL-MCNC: 8 G/DL (ref 6.4–8.2)
PROT UR-MCNC: NEGATIVE MG/DL
Q-T INTERVAL: 418 MS
QRS DURATION: 90 MS
QTC CALCULATION (BEZET): 438 MS
R AXIS: -27 DEGREES
RBC # BLD AUTO: 5.22 X10(6)UL
RHEUMATOID FACT SERPL-ACNC: <10 IU/ML (ref ?–15)
SODIUM SERPL-SCNC: 137 MMOL/L (ref 136–145)
SP GR UR STRIP: 1.02 (ref 1–1.03)
T AXIS: 29 DEGREES
TRIGL SERPL-MCNC: 56 MG/DL (ref 30–149)
URATE SERPL-MCNC: 6.4 MG/DL
UROBILINOGEN UR STRIP-ACNC: NORMAL
VENTRICULAR RATE: 66 BPM
VLDLC SERPL CALC-MCNC: 10 MG/DL (ref 0–30)
WBC # BLD AUTO: 4.9 X10(3) UL (ref 4–11)

## 2023-06-06 PROCEDURE — 93005 ELECTROCARDIOGRAM TRACING: CPT

## 2023-06-06 PROCEDURE — 36415 COLL VENOUS BLD VENIPUNCTURE: CPT | Performed by: INTERNAL MEDICINE

## 2023-06-06 PROCEDURE — 85025 COMPLETE CBC W/AUTO DIFF WBC: CPT | Performed by: INTERNAL MEDICINE

## 2023-06-06 PROCEDURE — 93010 ELECTROCARDIOGRAM REPORT: CPT | Performed by: STUDENT IN AN ORGANIZED HEALTH CARE EDUCATION/TRAINING PROGRAM

## 2023-06-06 PROCEDURE — 80061 LIPID PANEL: CPT | Performed by: INTERNAL MEDICINE

## 2023-06-06 PROCEDURE — 84550 ASSAY OF BLOOD/URIC ACID: CPT | Performed by: INTERNAL MEDICINE

## 2023-06-06 PROCEDURE — 86431 RHEUMATOID FACTOR QUANT: CPT

## 2023-06-06 PROCEDURE — 80053 COMPREHEN METABOLIC PANEL: CPT | Performed by: INTERNAL MEDICINE

## 2023-09-05 RX ORDER — ALBUTEROL SULFATE 90 UG/1
2 AEROSOL, METERED RESPIRATORY (INHALATION) EVERY 4 HOURS PRN
Qty: 8.5 G | Refills: 0 | Status: SHIPPED | OUTPATIENT
Start: 2023-09-05

## 2023-09-26 ENCOUNTER — TELEPHONE (OUTPATIENT)
Dept: INTERNAL MEDICINE CLINIC | Facility: CLINIC | Age: 73
End: 2023-09-26

## 2023-10-17 RX ORDER — DIAZEPAM 5 MG/1
5 TABLET ORAL DAILY
Qty: 30 TABLET | Refills: 2 | Status: SHIPPED | OUTPATIENT
Start: 2023-10-17

## 2023-10-17 NOTE — TELEPHONE ENCOUNTER
Requested Prescriptions     Pending Prescriptions Disp Refills    DIAZEPAM 5 MG Oral Tab [Pharmacy Med Name: DIAZEPAM 5MG TABLETS] 30 tablet 0     Sig: TAKE 1 TABLET(5 MG) BY MOUTH DAILY     LAST REFILL DATE 4/3/23   QUANTITY REQUESTED    DAY SUPPLY    DIAGNOSIS    LAST OFFICE VISIT  6/2/23   FOLLOW UP DUE    No future appointments.

## 2023-11-20 RX ORDER — ALLOPURINOL 300 MG/1
300 TABLET ORAL DAILY
Qty: 90 TABLET | Refills: 0 | Status: SHIPPED | OUTPATIENT
Start: 2023-11-20

## 2024-01-19 RX ORDER — DOXAZOSIN 8 MG/1
TABLET ORAL
Qty: 135 TABLET | Refills: 3 | OUTPATIENT
Start: 2024-01-19

## 2024-01-21 ENCOUNTER — MOBILE ENCOUNTER (OUTPATIENT)
Dept: INTERNAL MEDICINE CLINIC | Facility: CLINIC | Age: 74
End: 2024-01-21

## 2024-01-21 RX ORDER — DOXAZOSIN 8 MG/1
12 TABLET ORAL NIGHTLY
Qty: 45 TABLET | Refills: 8 | Status: SHIPPED | OUTPATIENT
Start: 2024-01-21 | End: 2024-10-17

## 2024-02-19 RX ORDER — ALLOPURINOL 300 MG/1
300 TABLET ORAL DAILY
Qty: 90 TABLET | Refills: 0 | OUTPATIENT
Start: 2024-02-19

## 2024-02-19 RX ORDER — ALLOPURINOL 300 MG/1
300 TABLET ORAL DAILY
Qty: 90 TABLET | Refills: 2 | Status: SHIPPED | OUTPATIENT
Start: 2024-02-19

## 2024-04-01 RX ORDER — ALBUTEROL SULFATE 90 UG/1
2 AEROSOL, METERED RESPIRATORY (INHALATION) EVERY 4 HOURS PRN
Qty: 8.5 G | Refills: 0 | OUTPATIENT
Start: 2024-04-01

## 2024-05-16 RX ORDER — DIAZEPAM 5 MG/1
5 TABLET ORAL DAILY
Qty: 30 TABLET | Refills: 0 | OUTPATIENT
Start: 2024-05-16

## 2024-05-30 ENCOUNTER — OFFICE VISIT (OUTPATIENT)
Dept: INTERNAL MEDICINE CLINIC | Facility: CLINIC | Age: 74
End: 2024-05-30

## 2024-05-30 VITALS
OXYGEN SATURATION: 95 % | SYSTOLIC BLOOD PRESSURE: 120 MMHG | HEART RATE: 70 BPM | WEIGHT: 227 LBS | BODY MASS INDEX: 32.5 KG/M2 | HEIGHT: 70 IN | DIASTOLIC BLOOD PRESSURE: 60 MMHG

## 2024-05-30 DIAGNOSIS — M51.36 DDD (DEGENERATIVE DISC DISEASE), LUMBAR: Primary | ICD-10-CM

## 2024-05-30 DIAGNOSIS — I65.23 BILATERAL CAROTID ARTERY STENOSIS: ICD-10-CM

## 2024-05-30 DIAGNOSIS — J45.20 MILD INTERMITTENT ASTHMA WITHOUT COMPLICATION (HCC): ICD-10-CM

## 2024-05-30 DIAGNOSIS — F41.9 ANXIETY: ICD-10-CM

## 2024-05-30 DIAGNOSIS — G89.29 CHRONIC RIGHT SHOULDER PAIN: ICD-10-CM

## 2024-05-30 DIAGNOSIS — M25.511 CHRONIC RIGHT SHOULDER PAIN: ICD-10-CM

## 2024-05-30 DIAGNOSIS — R06.09 DOE (DYSPNEA ON EXERTION): ICD-10-CM

## 2024-05-30 DIAGNOSIS — E78.5 DYSLIPIDEMIA: ICD-10-CM

## 2024-05-30 DIAGNOSIS — Z86.79 PERSONAL HISTORY OF ATRIAL FLUTTER: ICD-10-CM

## 2024-05-30 DIAGNOSIS — Z98.890 HISTORY OF CARDIAC RADIOFREQUENCY ABLATION: ICD-10-CM

## 2024-05-30 DIAGNOSIS — R68.82 LOW LIBIDO: ICD-10-CM

## 2024-05-30 DIAGNOSIS — I25.10 CORONARY ARTERY DISEASE DUE TO CALCIFIED CORONARY LESION: ICD-10-CM

## 2024-05-30 DIAGNOSIS — I71.21 ANEURYSM OF ASCENDING AORTA WITHOUT RUPTURE (HCC): ICD-10-CM

## 2024-05-30 DIAGNOSIS — N40.1 BENIGN PROSTATIC HYPERPLASIA WITH NOCTURIA: ICD-10-CM

## 2024-05-30 DIAGNOSIS — I25.84 CORONARY ARTERY DISEASE DUE TO CALCIFIED CORONARY LESION: ICD-10-CM

## 2024-05-30 DIAGNOSIS — R35.1 BENIGN PROSTATIC HYPERPLASIA WITH NOCTURIA: ICD-10-CM

## 2024-05-30 DIAGNOSIS — Z87.448 HISTORY OF HEMATURIA: ICD-10-CM

## 2024-05-30 DIAGNOSIS — M10.00 IDIOPATHIC GOUT, UNSPECIFIED CHRONICITY, UNSPECIFIED SITE: ICD-10-CM

## 2024-05-30 DIAGNOSIS — I35.1 NONRHEUMATIC AORTIC VALVE INSUFFICIENCY: ICD-10-CM

## 2024-05-30 PROCEDURE — G0439 PPPS, SUBSEQ VISIT: HCPCS | Performed by: INTERNAL MEDICINE

## 2024-05-30 PROCEDURE — 99213 OFFICE O/P EST LOW 20 MIN: CPT | Performed by: INTERNAL MEDICINE

## 2024-05-30 RX ORDER — DIAZEPAM 5 MG/1
5 TABLET ORAL DAILY
Qty: 30 TABLET | Refills: 2 | Status: SHIPPED | OUTPATIENT
Start: 2024-05-30

## 2024-05-30 NOTE — PROGRESS NOTES
Jim Amin is a 73 year old  who presents for a Medicare Subsequent Annual Wellness visit (Pt already had Initial Annual Wellness)    Discussed medicare wellness , reviewed assessments and health maintenance for screening and immunizations.    In addition medical issues  addressed today included ; we discussed his chronic back pain.  Seems to be stable has not done any chiropractic work in a while.  Still likes to swim.  He has aortic insufficiency but has not had recent echocardiogram has no symptoms.  His BPH is stable.  He did have some hematuria and had a negative workup.  Has history of bilateral carotid stenosis no symptoms.  No recent imaging.  Should be noted he is not taking a statin.  Discussed several reasons why he should take a statin including carotid stenosis as well as high calcium score.  He had a CT angiogram in the past which showed no significant obstructive coronary disease.  He also has an aneurysm but has not had recent imaging.  Last reported at 4.3 cm.  History of a flutter with ablation.  Mild asthma doing well with treatment.  He uses as needed Valium for some mild anxiety.  Since last visit he had his right shoulder repaired    Review of systems significant for some dyspnea on exertion, low libido.  Denies any chest pains.  No neurological symptoms.  No GI issues.  Allergies:   has No Known Allergies.  Medical History:    has no past medical history on file.  Surgical History:    has a past surgical history that includes other surgical history; inj paravert f jnt l/s 1 lev (6/18/2013); and inj paravert f jnt l/s 1 lev (7/3/2013).   Family History:   family history includes Diabetes in his father and mother.  Social History:    reports that he has never smoked. He has never used smokeless tobacco. He reports current alcohol use. He reports that he does not use drugs.        Fall Risk Assessment:   He has been screened for Falls and is low risk.      Cognitive Assessment:   He had  a completely normal cognitive assessment - see flowsheet entries     Functional Ability/Status:   Jim Amin has a completely normal functional assessment. See flowsheet for details.    Depression Screening (PHQ-2/PHQ-9): PHQ-2 SCORE: 0  , done 5/30/2024     Advanced Directives:   We discussed end-of-life issues at previous visit.  Wife Yennifer would make decisions for him    Tobacco:  He does not smoke or vape never has  CAGE Alcohol Screen:   CAGE screening score of 0 on 5/30/2024, showing low risk of alcohol abuse.          Patient Care Team:  Ab Campos MD as PCP - General (Internal Medicine)    Objective:   /60 (BP Location: Right arm, Patient Position: Sitting, Cuff Size: adult)   Pulse 70   Ht 5' 10\" (1.778 m)   Wt 227 lb (103 kg)   SpO2 95%   BMI 32.57 kg/m²    Estimated body mass index is 32.57 kg/m² as calculated from the following:    Height as of this encounter: 5' 10\" (1.778 m).    Weight as of this encounter: 227 lb (103 kg).    Head/neck ; nl no bruits noted    Lungs: Clear     Heart: Regular has murmur of aortic insufficiency    Abdomen unremarkable    Ext; nl -he does have some decreased muscles tone and his biceps    Neurological: No focal deficit, nl cognition   He had some wax curetted out of both ears.      Medicare Hearing Assessment:  Hearing Screening    Time taken: 5/30/2024  3:10 PM  Entry User: Ab Campos MD  Screening Method: Finger Rub  Finger Rub Result: Pass       Visual Acuity:   Visual Acuity:   Right Eye Visual Acuity: Corrected Right Eye Chart Acuity: 20/40   Left Eye Visual Acuity: Corrected Left Eye Chart Acuity: 20/40   Both Eyes Visual Acuity: Corrected Both Eyes Chart Acuity: 20/40   Able To Tolerate Visual Acuity: Yes        Current Outpatient Medications:     diazePAM 5 MG Oral Tab, Take 1 tablet (5 mg total) by mouth daily., Disp: 30 tablet, Rfl: 2    allopurinol 300 MG Oral Tab, Take 1 tablet (300 mg total) by mouth daily., Disp: 90 tablet, Rfl:  2    doxazosin 8 MG Oral Tab, Take 1.5 tablets (12 mg total) by mouth nightly., Disp: 45 tablet, Rfl: 8    albuterol 108 (90 Base) MCG/ACT Inhalation Aero Soln, Inhale 2 puffs into the lungs every 4 (four) hours as needed., Disp: 8.5 g, Rfl: 0    diclofenac 50 MG Oral Tab EC, Take 1 tablet (50 mg total) by mouth 2 (two) times daily with meals., Disp: , Rfl:     sildenafil 20 MG Oral Tab, , Disp: , Rfl:     azelastine 0.1 % Nasal Solution, 1 spray by Nasal route 2 (two) times daily., Disp: 30 mL, Rfl: 2    aspirin 81 MG Oral Chew Tab, Chew 1 tablet (81 mg total) by mouth daily., Disp: , Rfl:     pravastatin 40 MG Oral Tab, Take 1 tablet (40 mg total) by mouth nightly. (Patient not taking: Reported on 2/2/2023), Disp: 90 tablet, Rfl: 3     ASSESSMENT  and   PLAN    Medicare wellness  Dicussed prevention screening test and immunization for age  Reinforced healthy diet, lifestyle, and exercise. Discussed current state of health.    Medical issues     1. DDD (degenerative disc disease), lumbar (Primary)-has no sciatic symptoms.  Recommend to do massage therapy.  Exercise.  2. Nonrheumatic aortic valve insufficiency-needs echocardiogram to assess significance.  Currently has no symptoms  -     CARD ECHO 2D DOPPLER (CPT=93306); Future; Expected date: 05/30/2024  3. Benign prostatic hyperplasia with nocturia-currently on treatment symptoms are controlled.-  4. Bilateral carotid artery stenosis-is asymptomatic will obtain imaging.  Recommend to take aspirin and statin.  -     US CAROTID DOPPLER BILAT - DIAG IMG (CPT=93880); Future; Expected date: 05/30/2024  5. Aneurysm of ascending aorta without rupture (HCC)-will check CAT scan to make sure has not expanded  -     CT CHEST (CPT=71250); Future; Expected date: 05/30/2024  6. History of cardiac radiofrequency ablation-Dr. Harpreet Russell has no symptoms  7. Personal history of atrial flutter-no symptoms exam reveals regular rhythm not on anticoagulation  8. Idiopathic gout,  unspecified chronicity, unspecified site-stable on allopurinol he was wondering if he could have inflammatory arthritis I told him he does not have symptoms.  -     CBC With Differential With Platelet; Future; Expected date: 05/30/2024  -     Uric Acid; Future; Expected date: 05/30/2024  9. Mild intermittent asthma without complication (HCC)-doing well with current treatment  10. Dyslipidemia-currently not on statin had issues with Lipitor in the past.  Recommend to take his pravastatin  -     Comp Metabolic Panel (14); Future; Expected date: 05/30/2024  -     Lipid Panel; Future; Expected date: 05/30/2024  11. Anxiety-discussed benefits over risk  -     diazePAM; Take 1 tablet (5 mg total) by mouth daily.  Dispense: 30 tablet; Refill: 2  12. Chronic right shoulder pain-status post surgery  13. History of hematuria-he had workup which was negative  -     CBC With Differential With Platelet; Future; Expected date: 05/30/2024  14. BONILLA (dyspnea on exertion)-will obtain stress test since he has coronary artery disease.  -     CARD NUC EXERCISE STRESS (REST/EXER) (CPT 64658); Future; Expected date: 05/30/2024  -     Comp Metabolic Panel (14); Future; Expected date: 05/30/2024  15. Coronary artery disease due to calcified coronary lesion-will obtain stress test  -     CARD NUC EXERCISE STRESS (REST/EXER) (CPT 64374); Future; Expected date: 05/30/2024  16. Low libido-check testosterone level  -     Testosterone Total; Future; Expected date: 05/30/2024                No follow-ups on file.     Ab Campos MD      General Health:  In the past six months, have you lost more than 10 pounds without trying?: 2 - No  Has your appetite been poor?: No  Type of Diet: Balanced  How does the patient maintain a good energy level?: Appropriate Exercise;Daily Walks;Stretching  How would you describe your daily physical activity?: Heavy  How would you describe your current health state?: Good  How do you maintain positive mental  well-being?: Social Interaction;Visiting Friends;Visiting Family  On a scale of 0 to 10, with 0 being no pain and 10 being severe pain, what is your pain level?: 3 - (Mild)  In the past six months, have you experienced urine leakage?: 0-No  At any time do you feel concerned for the safety/well-being of yourself and/or your children, in your home or elsewhere?: No  Have you had any immunizations at another office such as Influenza, Hepatitis B, Tetanus, or Pneumococcal?: No          Jmi Amin's SCREENING SCHEDULE   Tests on this list are recommended by your physician but may not be covered, or covered at this frequency, by your insurer.   Please check with your insurance carrier before scheduling to verify coverage.   PREVENTATIVE SERVICES FREQUENCY &  COVERAGE DETAILS LAST COMPLETION DATE   Diabetes Screening    Fasting Blood Sugar / Glucose    One screening every 12 months if never tested or if previously tested but not diagnosed with pre-diabetes   One screening every 6 months if diagnosed with pre-diabetes Lab Results   Component Value Date    GLU 98 06/06/2023        Cardiovascular Disease Screening    Lipid Panel  Cholesterol  Lipoprotein (HDL)  Triglycerides Covered every 5 years for all Medicare beneficiaries without apparent signs or symptoms of cardiovascular disease Lab Results   Component Value Date    CHOLEST 191 06/06/2023    HDL 65 (H) 06/06/2023     (H) 06/06/2023    TRIG 56 06/06/2023         Electrocardiogram (EKG)   Covered if needed at Welcome to Medicare, and non-screening if indicated for medical reasons 06/06/2023      Ultrasound Screening for Abdominal Aortic Aneurysm (AAA) Covered once in a lifetime for one of the following risk factors    Men who are 65-75 years old and have ever smoked    Anyone with a family history -     Colorectal Cancer Screening  Covered for ages 50-85; only need ONE of the following:    Colonoscopy   Covered every 10 years    Covered every 2 years if  patient is at high risk or previous colonoscopy was abnormal -    Health Maintenance   Topic Date Due    Colorectal Cancer Screening  12/05/2023       Flexible Sigmoidoscopy   Covered every 4 years -    Fecal Occult Blood Test Covered annually -   Prostate Cancer Screening    Prostate-Specific Antigen (PSA) Annually No results found for: \"PSA\"  Health Maintenance   Topic Date Due    PSA  06/06/2025      Immunizations    Influenza Covered once per flu season  Please get every year -  No recommendations at this time    Pneumococcal Each vaccine ([id number] & [id number]) covered once after 65 Prevnar 13: 02/04/2021    [id number]: -     Pneumococcal Vaccination(2 of 2 - PPSV23 or PCV20) due on 04/01/2021    Hepatitis B One screening covered for patients with certain risk factors   -  No recommendations at this time    Tetanus Toxoid Not covered by Medicare Part B unless medically necessary (cut with metal); may be covered with your pharmacy prescription benefits -    Tetanus, Diptheria and Pertusis TD and TDaP Not covered by Medicare Part B -  No recommendations at this time    Zoster Not covered by Medicare Part B; may be covered with your pharmacy  prescription benefits -  Zoster Vaccines(1 of 2) Never done

## 2024-05-30 NOTE — PROGRESS NOTES
Jim Amin  73 year old     .    Is here for preventative wellness exam        Last visit last June ROS    GENERAL:no systemic symptoms, screening for depression negative, does not drink excessively, no drug use,    Discussed weight/BMI    LUNGS:denies shortness of breath or resp symptoms    CARDIOVASCULAR: denies chest pain     GI: Does not endorse any GI SYMPTOMS    ALL OTHER REVIEW IS NEGATIVE         Patient Active Problem List   Diagnosis    DDD (degenerative disc disease), lumbar    Aortic insufficiency    BPH (benign prostatic hyperplasia)    Carotid artery stenosis    Gout    Thoracic aortic aneurysm (HCC)    Personal history of atrial flutter    History of cardiac radiofrequency ablation    Mild intermittent asthma without complication (HCC)    Anxiety     Past Surgical History:   Procedure Laterality Date    Inj paravert f jnt l/s 1 lev  6/18/2013    Procedure: LUMBAR FACET INJECTION OR MEDIAL BRANCH NERVE BLOCK;  Surgeon: Jairon Mejia MD;  Location: [facility]    Inj paravert f jnt l/s 1 lev  7/3/2013    Procedure: LUMBAR FACET INJECTION OR MEDIAL BRANCH NERVE BLOCK;  Surgeon: Jairon Mejia MD;  Location: [facility]    Other surgical history        Family History   Problem Relation Age of Onset    Diabetes Father     Diabetes Mother      Social History     Socioeconomic History    Marital status:    Tobacco Use    Smoking status: Never    Smokeless tobacco: Never   Substance and Sexual Activity    Alcohol use: Yes    Drug use: Never     Social Determinants of Health      [facility]    Housing Stability      Current Outpatient Medications:     allopurinol 300 MG Oral Tab, Take 1 tablet (300 mg total) by mouth daily., Disp: 90 tablet, Rfl: 2    doxazosin 8 MG Oral Tab, Take 1.5 tablets (12 mg total) by mouth nightly., Disp: 45 tablet, Rfl: 8    diazePAM 5 MG Oral Tab, Take 1  tablet (5 mg total) by mouth daily., Disp: 30 tablet, Rfl: 2    albuterol 108 (90 Base) MCG/ACT Inhalation Aero Soln, Inhale 2 puffs into the lungs every 4 (four) hours as needed., Disp: 8.5 g, Rfl: 0    diclofenac 50 MG Oral Tab EC, Take 1 tablet (50 mg total) by mouth 2 (two) times daily with meals., Disp: , Rfl:     sildenafil 20 MG Oral Tab, , Disp: , Rfl:     pravastatin 40 MG Oral Tab, Take 1 tablet (40 mg total) by mouth nightly. (Patient not taking: Reported on 2/2/2023), Disp: 90 tablet, Rfl: 3    azelastine 0.1 % Nasal Solution, 1 spray by Nasal route 2 (two) times daily., Disp: 30 mL, Rfl: 2    aspirin 81 MG Oral Chew Tab, Chew 1 tablet (81 mg total) by mouth daily., Disp: , Rfl:              EXAM:     There were no vitals filed for this visit.VITALSThere is no height or weight on file to calculate BMI.      Wt Readings from Last 3 Encounters:   06/02/23 242 lb (109.8 kg)   02/02/23 233 lb (105.7 kg)   11/02/22 238 lb (108 kg)           BP Readings from Last 3 Encounters:   06/02/23 116/78   02/02/23 118/68   11/02/22 130/78           Appears Healthy    Neck: normal    Lungs: clear , nl     Heart:  Reg    Abdomen:  Nondistended    Extremities:  nl     Skin:  no suspicious lesions    Neurological: nl       There are no diagnoses linked to this encounter.      Discussed importance of healthy lifestyle with exercise and diet.  Recommend to avoid tobacco  Keep alcohol use to a minimum  Discussed appropriate screening tests;   Discussed vaccines      This note was prepared using [employer] voice recognition dictation software and as a result, errors may occur. When identified, these errors have been corrected. While every attempt is made to correct errors during dictation, discrepancies may still exist       Ab Campos MD

## 2024-06-20 ENCOUNTER — LAB ENCOUNTER (OUTPATIENT)
Dept: LAB | Facility: REFERENCE LAB | Age: 74
End: 2024-06-20
Attending: INTERNAL MEDICINE

## 2024-06-20 DIAGNOSIS — R06.09 DOE (DYSPNEA ON EXERTION): ICD-10-CM

## 2024-06-20 DIAGNOSIS — E78.5 DYSLIPIDEMIA: ICD-10-CM

## 2024-06-20 DIAGNOSIS — M10.00 IDIOPATHIC GOUT, UNSPECIFIED CHRONICITY, UNSPECIFIED SITE: ICD-10-CM

## 2024-06-20 DIAGNOSIS — R68.82 LOW LIBIDO: ICD-10-CM

## 2024-06-20 DIAGNOSIS — Z87.448 HISTORY OF HEMATURIA: ICD-10-CM

## 2024-06-20 LAB
ALBUMIN SERPL-MCNC: 4.1 G/DL (ref 3.2–4.8)
ALBUMIN/GLOB SERPL: 1.3 {RATIO} (ref 1–2)
ALP LIVER SERPL-CCNC: 88 U/L
ALT SERPL-CCNC: 28 U/L
ANION GAP SERPL CALC-SCNC: 3 MMOL/L (ref 0–18)
AST SERPL-CCNC: 38 U/L (ref ?–34)
BASOPHILS # BLD AUTO: 0.04 X10(3) UL (ref 0–0.2)
BASOPHILS NFR BLD AUTO: 0.8 %
BILIRUB SERPL-MCNC: 0.9 MG/DL (ref 0.2–1.1)
BUN BLD-MCNC: 22 MG/DL (ref 9–23)
BUN/CREAT SERPL: 20.6 (ref 10–20)
CALCIUM BLD-MCNC: 9.6 MG/DL (ref 8.7–10.4)
CHLORIDE SERPL-SCNC: 106 MMOL/L (ref 98–112)
CHOLEST SERPL-MCNC: 166 MG/DL (ref ?–200)
CO2 SERPL-SCNC: 29 MMOL/L (ref 21–32)
CREAT BLD-MCNC: 1.07 MG/DL
DEPRECATED RDW RBC AUTO: 46 FL (ref 35.1–46.3)
EGFRCR SERPLBLD CKD-EPI 2021: 74 ML/MIN/1.73M2 (ref 60–?)
EOSINOPHIL # BLD AUTO: 0.17 X10(3) UL (ref 0–0.7)
EOSINOPHIL NFR BLD AUTO: 3.3 %
ERYTHROCYTE [DISTWIDTH] IN BLOOD BY AUTOMATED COUNT: 13.8 % (ref 11–15)
FASTING PATIENT LIPID ANSWER: YES
FASTING STATUS PATIENT QL REPORTED: YES
GLOBULIN PLAS-MCNC: 3.1 G/DL (ref 2–3.5)
GLUCOSE BLD-MCNC: 92 MG/DL (ref 70–99)
HCT VFR BLD AUTO: 46.3 %
HDLC SERPL-MCNC: 63 MG/DL (ref 40–59)
HGB BLD-MCNC: 16.2 G/DL
IMM GRANULOCYTES # BLD AUTO: 0.01 X10(3) UL (ref 0–1)
IMM GRANULOCYTES NFR BLD: 0.2 %
LDLC SERPL CALC-MCNC: 94 MG/DL (ref ?–100)
LYMPHOCYTES # BLD AUTO: 1.48 X10(3) UL (ref 1–4)
LYMPHOCYTES NFR BLD AUTO: 28.4 %
MCH RBC QN AUTO: 31.8 PG (ref 26–34)
MCHC RBC AUTO-ENTMCNC: 35 G/DL (ref 31–37)
MCV RBC AUTO: 91 FL
MONOCYTES # BLD AUTO: 0.64 X10(3) UL (ref 0.1–1)
MONOCYTES NFR BLD AUTO: 12.3 %
NEUTROPHILS # BLD AUTO: 2.87 X10 (3) UL (ref 1.5–7.7)
NEUTROPHILS # BLD AUTO: 2.87 X10(3) UL (ref 1.5–7.7)
NEUTROPHILS NFR BLD AUTO: 55 %
NONHDLC SERPL-MCNC: 103 MG/DL (ref ?–130)
OSMOLALITY SERPL CALC.SUM OF ELEC: 289 MOSM/KG (ref 275–295)
PLATELET # BLD AUTO: 168 10(3)UL (ref 150–450)
POTASSIUM SERPL-SCNC: 5.4 MMOL/L (ref 3.5–5.1)
PROT SERPL-MCNC: 7.2 G/DL (ref 5.7–8.2)
RBC # BLD AUTO: 5.09 X10(6)UL
SODIUM SERPL-SCNC: 138 MMOL/L (ref 136–145)
TESTOST SERPL-MCNC: 920.89 NG/DL
TRIGL SERPL-MCNC: 39 MG/DL (ref 30–149)
URATE SERPL-MCNC: 5.5 MG/DL
VLDLC SERPL CALC-MCNC: 6 MG/DL (ref 0–30)
WBC # BLD AUTO: 5.2 X10(3) UL (ref 4–11)

## 2024-06-20 PROCEDURE — 85025 COMPLETE CBC W/AUTO DIFF WBC: CPT

## 2024-06-20 PROCEDURE — 80061 LIPID PANEL: CPT

## 2024-06-20 PROCEDURE — 84403 ASSAY OF TOTAL TESTOSTERONE: CPT

## 2024-06-20 PROCEDURE — 36415 COLL VENOUS BLD VENIPUNCTURE: CPT

## 2024-06-20 PROCEDURE — 80053 COMPREHEN METABOLIC PANEL: CPT

## 2024-06-20 PROCEDURE — 84550 ASSAY OF BLOOD/URIC ACID: CPT

## 2024-06-25 ENCOUNTER — HOSPITAL ENCOUNTER (OUTPATIENT)
Dept: ULTRASOUND IMAGING | Facility: HOSPITAL | Age: 74
Discharge: HOME | End: 2024-06-25
Attending: INTERNAL MEDICINE

## 2024-06-25 ENCOUNTER — HOSPITAL ENCOUNTER (OUTPATIENT)
Dept: CT IMAGING | Facility: HOSPITAL | Age: 74
Discharge: HOME | End: 2024-06-25
Attending: INTERNAL MEDICINE

## 2024-06-25 DIAGNOSIS — I65.23 BILATERAL CAROTID ARTERY STENOSIS: ICD-10-CM

## 2024-06-25 DIAGNOSIS — I71.21 ANEURYSM OF ASCENDING AORTA WITHOUT RUPTURE (HCC): ICD-10-CM

## 2024-06-25 PROCEDURE — 71250 CT THORAX DX C-: CPT | Performed by: INTERNAL MEDICINE

## 2024-06-25 PROCEDURE — 93880 EXTRACRANIAL BILAT STUDY: CPT | Performed by: INTERNAL MEDICINE

## 2024-07-08 ENCOUNTER — HOSPITAL ENCOUNTER (OUTPATIENT)
Dept: NUCLEAR MEDICINE | Facility: HOSPITAL | Age: 74
Discharge: HOME | End: 2024-07-08
Attending: INTERNAL MEDICINE
Payer: MEDICARE

## 2024-07-08 ENCOUNTER — HOSPITAL ENCOUNTER (OUTPATIENT)
Dept: CV DIAGNOSTICS | Facility: HOSPITAL | Age: 74
Discharge: HOME | End: 2024-07-08
Attending: INTERNAL MEDICINE
Payer: MEDICARE

## 2024-07-08 DIAGNOSIS — I25.84 CORONARY ARTERY DISEASE DUE TO CALCIFIED CORONARY LESION: ICD-10-CM

## 2024-07-08 DIAGNOSIS — I35.1 NONRHEUMATIC AORTIC VALVE INSUFFICIENCY: ICD-10-CM

## 2024-07-08 DIAGNOSIS — R06.09 DOE (DYSPNEA ON EXERTION): ICD-10-CM

## 2024-07-08 DIAGNOSIS — I25.10 CORONARY ARTERY DISEASE DUE TO CALCIFIED CORONARY LESION: ICD-10-CM

## 2024-07-08 PROCEDURE — 93018 CV STRESS TEST I&R ONLY: CPT | Performed by: INTERNAL MEDICINE

## 2024-07-08 PROCEDURE — 93016 CV STRESS TEST SUPVJ ONLY: CPT | Performed by: INTERNAL MEDICINE

## 2024-07-08 PROCEDURE — 93017 CV STRESS TEST TRACING ONLY: CPT | Performed by: INTERNAL MEDICINE

## 2024-07-08 PROCEDURE — 93306 TTE W/DOPPLER COMPLETE: CPT | Performed by: INTERNAL MEDICINE

## 2024-07-08 PROCEDURE — 78452 HT MUSCLE IMAGE SPECT MULT: CPT | Performed by: INTERNAL MEDICINE

## 2024-07-09 LAB
% OF MAX PREDICTED HR: 100 %
MAX DIASTOLIC BP: 84 MMHG
MAX HEART RATE: 150 BPM
MAX PREDICTED HEART RATE: 149 BPM
MAX SYSTOLIC BP: 170 MMHG
MAX WORK LOAD: 121

## 2024-07-15 ENCOUNTER — OFFICE VISIT (OUTPATIENT)
Dept: INTERNAL MEDICINE CLINIC | Facility: CLINIC | Age: 74
End: 2024-07-15
Payer: MEDICARE

## 2024-07-15 VITALS
SYSTOLIC BLOOD PRESSURE: 102 MMHG | HEART RATE: 64 BPM | BODY MASS INDEX: 31.21 KG/M2 | WEIGHT: 218 LBS | HEIGHT: 70 IN | OXYGEN SATURATION: 94 % | DIASTOLIC BLOOD PRESSURE: 62 MMHG

## 2024-07-15 DIAGNOSIS — M54.50 CHRONIC RIGHT-SIDED LOW BACK PAIN WITHOUT SCIATICA: ICD-10-CM

## 2024-07-15 DIAGNOSIS — I71.21 ANEURYSM OF ASCENDING AORTA WITHOUT RUPTURE (HCC): Primary | ICD-10-CM

## 2024-07-15 DIAGNOSIS — G89.29 CHRONIC RIGHT-SIDED LOW BACK PAIN WITHOUT SCIATICA: ICD-10-CM

## 2024-07-15 DIAGNOSIS — I35.1 NONRHEUMATIC AORTIC VALVE INSUFFICIENCY: ICD-10-CM

## 2024-07-15 DIAGNOSIS — Z98.890 HISTORY OF CARDIAC RADIOFREQUENCY ABLATION: ICD-10-CM

## 2024-07-15 DIAGNOSIS — I65.23 BILATERAL CAROTID ARTERY STENOSIS: ICD-10-CM

## 2024-07-15 DIAGNOSIS — Z12.11 COLON CANCER SCREENING: ICD-10-CM

## 2024-07-15 PROCEDURE — 99214 OFFICE O/P EST MOD 30 MIN: CPT | Performed by: INTERNAL MEDICINE

## 2024-07-15 PROCEDURE — G2211 COMPLEX E/M VISIT ADD ON: HCPCS | Performed by: INTERNAL MEDICINE

## 2024-07-15 NOTE — PROGRESS NOTES
Jim Amin male 73 year old         Chief Complaint   Patient presents with    Test Results     Brought reports with him+    Lab Results     Brought results with him    Referral     GI     Reviewed multiple test that he recently had.  He has increased size of his thoracic aortic aneurysm but has conflicting numbers in the past ranging from 4.2-4.3 since 2019.  Most recent was 4.6.  Discussed the guidelines of surveillance and need for surgery if increasing by more than 0.5 in 1 year or consecutive years of increasing more than 0.2.-0.3.  He has not met the criteria he has no symptoms    He did well on his stress test with going more than 9 minutes.  Imaging was negative    He has some carotid artery atherosclerosis which persist but has no significant blockage.    Echo shows mild to moderate aortic insufficiency mild aortic stenosis does not have bicuspid valve.    We discussed some chronic back pain no significant sciatica.  He was wondering about an MRI I told him he should see physiatry.    He had a colonoscopy approximately 9 years ago will refer for colonoscopy with GI.  Patient Active Problem List   Diagnosis    DDD (degenerative disc disease), lumbar    Aortic insufficiency    BPH (benign prostatic hyperplasia)    Carotid artery stenosis    Gout    Thoracic aortic aneurysm (HCC)    Personal history of atrial flutter    History of cardiac radiofrequency ablation    Mild intermittent asthma without complication (HCC)    Anxiety    Chronic right shoulder pain    History of hematuria          Current Outpatient Medications on File Prior to Visit   Medication Sig Dispense Refill    diazePAM 5 MG Oral Tab Take 1 tablet (5 mg total) by mouth daily. 30 tablet 2    allopurinol 300 MG Oral Tab Take 1 tablet (300 mg total) by mouth daily. 90 tablet 2    doxazosin 8 MG Oral Tab Take 1.5 tablets (12 mg total) by mouth nightly. 45 tablet 8    albuterol 108 (90 Base) MCG/ACT Inhalation Aero Soln Inhale 2 puffs  into the lungs every 4 (four) hours as needed. 8.5 g 0    diclofenac 50 MG Oral Tab EC Take 1 tablet (50 mg total) by mouth 2 (two) times daily with meals.      sildenafil 20 MG Oral Tab       azelastine 0.1 % Nasal Solution 1 spray by Nasal route 2 (two) times daily. 30 mL 2    aspirin 81 MG Oral Chew Tab Chew 1 tablet (81 mg total) by mouth daily.      pravastatin 40 MG Oral Tab Take 1 tablet (40 mg total) by mouth nightly. (Patient not taking: Reported on 2/2/2023) 90 tablet 3     No current facility-administered medications on file prior to visit.          Vitals:    07/15/24 0943   BP: 102/62   Pulse: 64   VITALSBody mass index is 31.28 kg/m².    Pertinent findings on the physical exam; heart is regular has aortic insufficiency murmur.  Looks great and has lost 20 pounds.  No bruits were appreciated    Jim was seen today for test results, lab results and referral.    Diagnoses and all orders for this visit:    Aneurysm of ascending aorta without rupture (HCC)  -     Vascular Surgery - Dr. Samer Najjar [facility] [id number]    Chronic right-sided low back pain without sciatica  -     PHYSIATRY - INTERNAL; Future    Colon cancer screening  -     GASTRO - INTERNAL; Future    Bilateral carotid artery stenosis    Nonrheumatic aortic valve insufficiency    History of cardiac radiofrequency ablation    Will refer to vascular surgery for his aneurysm to get a second opinion.  I do not think he needs a procedure at this time.    He has some aortic insufficiency will need follow-up echocardiogram once a year.    He has carotid artery stenosis no significant blockage.    Has high calcium score recent stress test negative    Would like him to take a statin but he states he cannot because of myalgias.  Is on aspirin.    Discussed need for colon cancer screening will refer to GI    Has history of ablation for A-fib no signs of recurrence    For his back pain will refer to physiatry.                This note was prepared using  Dragon Medical voice recognition dictation software and as a result, errors may occur. When identified, these errors have been corrected. While every attempt is made to correct errors during dictation, discrepancies may still exist

## 2024-08-21 ENCOUNTER — TELEPHONE (OUTPATIENT)
Dept: INTERNAL MEDICINE CLINIC | Facility: CLINIC | Age: 74
End: 2024-08-21

## 2024-08-21 NOTE — TELEPHONE ENCOUNTER
Spoke to Pt, reminded him he's due for his colonoscopy. Pt states he will be going on vacation soon and plans to schedule it in September.

## 2024-09-25 ENCOUNTER — NURSE ONLY (OUTPATIENT)
Facility: CLINIC | Age: 74
End: 2024-09-25

## 2024-09-25 DIAGNOSIS — Z12.11 COLON CANCER SCREENING: Primary | ICD-10-CM

## 2024-09-25 RX ORDER — SODIUM, POTASSIUM,MAG SULFATES 17.5-3.13G
SOLUTION, RECONSTITUTED, ORAL ORAL
Qty: 1 EACH | Refills: 0 | Status: SHIPPED | OUTPATIENT
Start: 2024-09-25

## 2024-09-25 NOTE — PROGRESS NOTES
Called patient for scheduled TCS  Medications, pharmacy, and allergies reviewed.   Please advise on colonoscopy and bowel prep orders.     Age 45-76 y/o:   › MD preference:   › Insurance:  MEDICARE  › Last pcp Visit: 7/15/2024  › Last CBC: 6/20/2024  › H/W/BMI: 5'10\" / 218lbs / 31/28    Special comments/notes:  Patient states last colonoscopy was completed 12 years ago at [facility] - no polyps noted. Patient's PCP recommends getting a colonoscopy since it has been over 10 years.  Telephone Colon Screening Questionnaire Yes No   Are you currently experiencing any new/abnormal GI symptoms? [] [x]   If yes, explain:     Rectal bleeding? [] [x]   Black stool? [] [x]   Dysphagia or food \"feeling stuck\" when eating? [] [x]   Intractable vomiting? [] [x]   Unexplained weight loss? [] [x]   First colonoscopy? [] [x]   Family history of colon cancer? [] [x]   Any issues with anesthesia? [] [x]   If yes, explain:      Any recent complaints of chest pain or shortness of breath?  [] [x]   Referred to a cardiologist?  [] [x]   If yes, explain:      Stroke, heart attack or stent placement in the last 12 months:  [] [x]   History of  respiratory issues/oxygen/ANTHONY/COPD: [] [x]   If yes, CPAP/BiPAP:     History of devices (pacemaker/defibrillator) [] [x]   History of cardiac/CVA/(MI/stroke): [] [x]     Medications Yes  No   Anticoagulants (except Aspirin):  [] [x]   Diabetic Meds  PO: Hold day before and day of procedure  Insulin:  [] [x]   Weight loss meds (phentermine/vyvanse/saxsenda/etc): [] [x]   Iron/herbal/multivitamin supplement: [x] []   Usage of marijuana, CBD &/or vape products: [] [x]

## 2024-09-25 NOTE — PROGRESS NOTES
Dx: average risk crc screening  Colonoscopy with MAC sedation  Split dose suprep, sent to pharmacy  Please review prep instructions with patient    - If the patient is taking oral diabetic medications then they should HOLD diabetic medications the night before and/or the day of the procedure   - If the patient is on insulin, please have the PCP or endocrinologist assist with management of dosing prior to the procedure.  - NO herbal supplements or weight loss medications x 7 days prior to the procedure.  - If patient is taking Xarelto OR Eliquis then it needs to be helf for 48 hours prior to the procedure --> Should get approval from the prescribing physician (PCP or cardiologist) to hold this medication prior to the procedure.  - If the patient is taking Coumadin then it needs to be on hold Coumadin for 5 days prior to the procedure --> Should get approval from the prescribing physician (PCP or cardiologist) to hold this medication prior to the procedure.    *If the bowel prep prescribed is too expensive/not covered by the patient's insurance please pend an alternative and I will sign that order.    Thank you.

## 2024-09-25 NOTE — PROGRESS NOTES
Dr. Aguirre    Please review screening below from patient and assist with orders if appropriate.    Patient is a 74 year old male that is due for colonoscopy. Has some cardiac history that is managed by Dr. Campos. Patient denies GI symptoms    Thank you   History  Chief Complaint   Patient presents with    Flu Symptoms     Pt c/o dizziness, nausea, vomiting, and diarrhea that started this morning  Magalie Schroeder is a 62 y/o male presenting to the ER with nausea, vomiting and diarrhea  Patient states for the last week his stools have been softer than normal, not liquid in nature  States today he has 5 episodes of vomiting, bile in nature  Reports he feels nauseous, but the vomiting has resolved  States he tried taking yogurt to settle his stomach, but ended up vomiting that before he arrived to the ER  Last BM was this morning, denies diarrhea "just shorter than kaylie " Patient also states he started feeling dizzy this morning, like he is unsteady and off-balanced  Denies fainting, LOC or hitting his head  Patient states he is lightheaded at rest  Patient reports shortness of breath and palpitations, states he used his albuterol inhaler 4 times yesterday which is more than normal  Patient denies headaches, visual disturbances/blurry vision, trouble swallowing, polyuria, polydipsia, chest pain, abdominal pain, numbness/tingling in extremities  Patient denies smoking history  Patient was recently seen for DSOUZA by PCP, which no definitive diagnosis  He states he tried to obtain CT chest yesterday, but his PCP did not send the order  Patient was just started on albuterol 2 weeks ago and states it does help with his SOB  History provided by:  Patient   used: No    Flu Symptoms   Presenting symptoms: diarrhea, nausea, shortness of breath and vomiting    Presenting symptoms: no cough, no fever, no headaches, no myalgias, no rhinorrhea and no sore throat    Severity:  Moderate  Onset quality:  Sudden  Duration:  4 hours  Progression:  Unchanged  Chronicity:  New  Relieved by:  Nothing  Worsened by:   Movement and eating  Ineffective treatments:  Rest, drinking and certain positions  Associated symptoms: no chills, no decreased appetite, no decrease in physical activity, no ear pain, no mental status change, no congestion, no neck stiffness and no syncope    Risk factors: being elderly    Risk factors: no sick contacts        Prior to Admission Medications   Prescriptions Last Dose Informant Patient Reported? Taking? Butalbital-APAP-Caffeine -40 MG per capsule  Self Yes Yes   Sig: TAKE ONE CAPSULE EVERY 4 HOURS AS NEEDED   EPIPEN 2-PARUL 0 3 MG/0 3ML SOAJ  Self Yes Yes   Sig: Inject 0 3 mg into the shoulder, thigh, or buttocks As directed   HYDROcodone-acetaminophen (XODOL) 7 5-300 MG per tablet  Self Yes Yes   Sig: TAKE 1 TABLET EVERY 4 TO 6 HOURS AS NEEDED FOR PAIN   albuterol (PROVENTIL HFA,VENTOLIN HFA) 90 mcg/act inhaler   No Yes   Sig: Inhale 2 puffs every 4 (four) hours as needed for wheezing or shortness of breath for up to 30 days   fluticasone (FLONASE) 50 mcg/act nasal spray  Self Yes Yes   Sig: INHALE 2 SPRAY DAILY IN EACH NOSTRIL   loratadine (CLARITIN) 10 mg tablet  Self Yes Yes   Sig: Take 10 mg by mouth daily   promethazine-codeine (PHENERGAN WITH CODEINE) 6 25-10 mg/5 mL syrup  Self Yes Yes   Sig: TAKE 5 ML (1 TEASPOONFUL) EVERY 4 HOURS AS NEEDED   tamsulosin (FLOMAX) 0 4 mg   No Yes   Sig: Take 1 capsule (0 4 mg total) by mouth daily with dinner for 30 days      Facility-Administered Medications: None       Past Medical History:   Diagnosis Date    Migraine        History reviewed  No pertinent surgical history  History reviewed  No pertinent family history  I have reviewed and agree with the history as documented  Social History   Substance Use Topics    Smoking status: Former Smoker    Smokeless tobacco: Never Used    Alcohol use Yes      Comment: socially        Review of Systems   Constitutional: Negative for chills, decreased appetite and fever  HENT: Negative for congestion, ear pain, rhinorrhea, sore throat, tinnitus and trouble swallowing  Eyes: Negative for photophobia and visual disturbance     Respiratory: Positive for shortness of breath and wheezing  Negative for cough and chest tightness  Cardiovascular: Positive for palpitations  Negative for chest pain and leg swelling  Gastrointestinal: Positive for diarrhea, nausea and vomiting  Negative for abdominal pain and blood in stool  Endocrine: Negative for polydipsia, polyphagia and polyuria  Genitourinary: Negative for difficulty urinating  Musculoskeletal: Positive for gait problem  Negative for arthralgias, back pain, myalgias and neck stiffness  Skin: Negative for color change and rash  Neurological: Positive for dizziness and light-headedness  Negative for syncope, weakness, numbness and headaches  Psychiatric/Behavioral: Negative for confusion  All other systems reviewed and are negative  Physical Exam  ED Triage Vitals [03/04/18 1104]   Temperature Pulse Respirations Blood Pressure SpO2   97 6 °F (36 4 °C) 72 18 163/81 95 %      Temp Source Heart Rate Source Patient Position - Orthostatic VS BP Location FiO2 (%)   Temporal Monitor Lying Left arm --      Pain Score       No Pain           Orthostatic Vital Signs  Vitals:    03/04/18 1104   BP: 163/81   Pulse: 72   Patient Position - Orthostatic VS: Lying       Physical Exam   Constitutional: He is oriented to person, place, and time  Vital signs are normal  He appears well-developed and well-nourished  He is active and cooperative  Non-toxic appearance  No distress  HENT:   Head: Normocephalic and atraumatic  Right Ear: Hearing, tympanic membrane, external ear and ear canal normal    Left Ear: Hearing, tympanic membrane, external ear and ear canal normal    Nose: Mucosal edema present  Mouth/Throat: Oropharynx is clear and moist and mucous membranes are normal    Eyes: Conjunctivae and EOM are normal  Pupils are equal, round, and reactive to light  Neck: Trachea normal, normal range of motion and full passive range of motion without pain  Neck supple     Cardiovascular: Normal rate, regular rhythm, S1 normal, S2 normal, normal heart sounds, intact distal pulses and normal pulses  No murmur heard  Pulmonary/Chest: Effort normal  No respiratory distress  He has no decreased breath sounds  He has wheezes (in all lung fields, end exp  wheezes )  He has no rhonchi  He has no rales  Abdominal: Soft  Normal appearance and bowel sounds are normal  There is no tenderness  Musculoskeletal: Normal range of motion  He exhibits no tenderness or deformity  Lymphadenopathy:     He has no cervical adenopathy  Neurological: He is alert and oriented to person, place, and time  He has normal strength  He is not disoriented  No cranial nerve deficit or sensory deficit  Skin: Skin is warm and dry  Capillary refill takes less than 2 seconds  No rash noted  He is not diaphoretic  No pallor  Psychiatric: He has a normal mood and affect  Thought content normal  His speech is delayed  He is slowed and withdrawn  Nursing note and vitals reviewed        ED Medications  Medications   sodium chloride 0 9 % infusion (not administered)   sodium chloride 0 9 % bolus 1,000 mL (1,000 mL Intravenous New Bag 3/4/18 1139)   ondansetron (ZOFRAN) injection 4 mg (4 mg Intravenous Given 3/4/18 1139)       Diagnostic Studies  Results Reviewed     Procedure Component Value Units Date/Time    BMP Q8 hours X 3 (Hyponatremia monitoring) [[id number]]     Lab Status:  No result Specimen:  Blood     Comprehensive metabolic panel [[id number]]  (Abnormal) Collected:  03/04/18 1138    Lab Status:  Final result Specimen:  Blood from Arm, Right Updated:  03/04/18 1220     Sodium 120 (L) mmol/L      Potassium 4 6 mmol/L      Chloride 85 (L) mmol/L      CO2 28 mmol/L      Anion Gap 7 mmol/L      BUN 12 mg/dL      Creatinine 0 75 mg/dL      Glucose 130 mg/dL      Calcium 7 9 (L) mg/dL      AST 42 U/L      ALT 45 U/L      Alkaline Phosphatase 50 U/L      Total Protein 8 3 (H) g/dL      Albumin 3 1 (L) g/dL      Total Bilirubin 0 60 mg/dL eGFR 97 ml/min/1 73sq m     Narrative:         National Kidney Disease Education Program recommendations are as follows:  GFR calculation is accurate only with a steady state creatinine  Chronic Kidney disease less than 60 ml/min/1 73 sq  meters  Kidney failure less than 15 ml/min/1 73 sq  meters  Lipase [[id number]]  (Normal) Collected:  03/04/18 1138    Lab Status:  Final result Specimen:  Blood from Arm, Right Updated:  03/04/18 1220     Lipase 181 u/L     TSH [[id number]]  (Normal) Collected:  03/04/18 1138    Lab Status:  Final result Specimen:  Blood from Arm, Right Updated:  03/04/18 1220     TSH 3RD GENERATON 1 569 uIU/mL     Narrative:         Patients undergoing fluorescein dye angiography may retain small amounts of fluorescein in the body for 48-72 hours post procedure  Samples containing fluorescein can produce falsely depressed TSH values  If the patient had this procedure,a specimen should be resubmitted post fluorescein clearance      CBC and differential [[id number]]  (Abnormal) Collected:  03/04/18 1138    Lab Status:  Final result Specimen:  Blood from Arm, Right Updated:  03/04/18 1152     WBC 6 39 Thousand/uL      RBC 5 20 Million/uL      Hemoglobin 14 4 g/dL      Hematocrit 41 0 %      MCV 79 (L) fL      MCH 27 7 pg      MCHC 35 1 g/dL      RDW 14 0 %      MPV 8 8 (L) fL      Platelets 438 Thousands/uL      Neutrophils Relative 82 (H) %      Lymphocytes Relative 11 (L) %      Monocytes Relative 6 %      Eosinophils Relative 1 %      Basophils Relative 0 %      Neutrophils Absolute 5 24 Thousands/µL      Lymphocytes Absolute 0 71 Thousands/µL      Monocytes Absolute 0 41 Thousand/µL      Eosinophils Absolute 0 03 Thousand/µL      Basophils Absolute 0 00 Thousands/µL     POCT urinalysis dipstick [[id number]]     Lab Status:  No result Specimen:  Urine                  XR chest 2 views    (Results Pending)              Procedures  Procedures       Phone Contacts  ED Phone Contact    ED Course  ED Course                                MDM  Number of Diagnoses or Management Options  Hyponatremia: new and requires workup  Nausea and vomiting: new and requires workup  Diagnosis management comments: Patient with nausea, vomiting, diarrhea, will order labs to r/o electrolyte abnormality and give IV hydration  Patient found to be hyponatremic, will admit and give IV fluids 75ml/hr and monitor BMP  Amount and/or Complexity of Data Reviewed  Clinical lab tests: reviewed and ordered  Tests in the radiology section of CPT®: ordered and reviewed    Patient Progress  Patient progress: stable    CritCare Time    Disposition  Final diagnoses:   Hyponatremia   Nausea and vomiting     Time reflects when diagnosis was documented in both MDM as applicable and the Disposition within this note     Time User Action Codes Description Comment    3/4/2018 12:58 PM Doylene Arrow Add [E87 1] Hyponatremia     3/4/2018 12:58 PM Stan Rodriguez Add [R11 2] Nausea and vomiting       ED Disposition     ED Disposition Condition Comment    Admit  Case was discussed with Dr Martha Loja and the patient's admission status was agreed to be Admission Status: inpatient status to the service of Dr Martha Loja   Follow-up Information    None       Patient's Medications   Discharge Prescriptions    No medications on file     No discharge procedures on file      ED Provider  Electronically Signed by           Chance Parker PA-C  03/04/18 1917

## 2024-09-27 ENCOUNTER — OFFICE VISIT (OUTPATIENT)
Dept: PHYSICAL MEDICINE AND REHAB | Facility: CLINIC | Age: 74
End: 2024-09-27
Payer: MEDICARE

## 2024-09-27 VITALS — HEIGHT: 71 IN | BODY MASS INDEX: 30.1 KG/M2 | WEIGHT: 215 LBS

## 2024-09-27 DIAGNOSIS — M79.18 BUTTOCK PAIN: Primary | ICD-10-CM

## 2024-09-27 DIAGNOSIS — G89.29 CHRONIC RIGHT-SIDED LOW BACK PAIN WITHOUT SCIATICA: ICD-10-CM

## 2024-09-27 DIAGNOSIS — M54.50 CHRONIC RIGHT-SIDED LOW BACK PAIN WITHOUT SCIATICA: ICD-10-CM

## 2024-09-27 PROCEDURE — 99204 OFFICE O/P NEW MOD 45 MIN: CPT | Performed by: PHYSICAL MEDICINE & REHABILITATION

## 2024-09-27 NOTE — PROGRESS NOTES
NEW PATIENT VISIT    CHIEF COMPLAINT  Low back pain      HISTORY OF PRESENTING ILLNESS  Jim Amin is a 74 year old male who presents for evaluation of low back pain.  Pain is primarily located in the right side of the low back which is been chronic for a number of years.  Patient states the pain radiates around the groin and feels like he has a tension in this area on the right side.  Pain is worse in the morning.  States that in 2010 2011 he was moving furniture and felt a sharp pain which has been intermittent since this time.  He is seen a chiropractor, has undergone injections, done acupuncture and did feel some relief from the acupuncture.    Currently uses anti-inflammatories as needed with some relief.  No physical therapy for this.  No recent imaging.  Current level pain is rated 6/10.    Patient's biggest complaint is localized pain in the right buttock in the gluteal musculature with intermittent radiation wrapping pain into the right lateral thigh.  Patient states that stretching his hip flexor will alleviate some of the right-sided buttock complaints.  He has difficulty with his golf swing as well as pretty intense stiffness in the morning and difficulty rising out of a laying position.    PAST MEDICAL HISTORY  No past medical history on file.    PAST SURGICAL HISTORY  Past Surgical History:   Procedure Laterality Date    Inj paravert f jnt l/s 1 lev  6/18/2013    Procedure: LUMBAR FACET INJECTION OR MEDIAL BRANCH NERVE BLOCK;  Surgeon: Jairon Mejia MD;  Location: [facility]    Inj paravert f jnt l/s 1 lev  7/3/2013    Procedure: LUMBAR FACET INJECTION OR MEDIAL BRANCH NERVE BLOCK;  Surgeon: Jairon Mejia MD;  Location: [facility]    Other surgical history         MEDICATIONS  Current Outpatient Medications on File Prior to Visit   Medication Sig Dispense Refill    Na Sulfate-K Sulfate-Mg Sulf (SUPREP BOWEL PREP KIT) 17.5-3.13-1.6 GM/177ML Oral  Solution Take as discussed in clinic 1 each 0    diazePAM 5 MG Oral Tab Take 1 tablet (5 mg total) by mouth daily. 30 tablet 2    allopurinol 300 MG Oral Tab Take 1 tablet (300 mg total) by mouth daily. 90 tablet 2    doxazosin 8 MG Oral Tab Take 1.5 tablets (12 mg total) by mouth nightly. 45 tablet 8    albuterol 108 (90 Base) MCG/ACT Inhalation Aero Soln Inhale 2 puffs into the lungs every 4 (four) hours as needed. 8.5 g 0    diclofenac 50 MG Oral Tab EC Take 1 tablet (50 mg total) by mouth 2 (two) times daily with meals.      sildenafil 20 MG Oral Tab       azelastine 0.1 % Nasal Solution 1 spray by Nasal route 2 (two) times daily. 30 mL 2    aspirin 81 MG Oral Chew Tab Chew 1 tablet (81 mg total) by mouth daily.       No current facility-administered medications on file prior to visit.       ALLERGIES  No Known Allergies    SOCIAL HISTORY   reports that he has never smoked. He has never used smokeless tobacco. He reports current alcohol use. He reports that he does not use drugs.    FAMILY HISTORY  Family History   Problem Relation Age of Onset    Diabetes Father     Diabetes Mother        REVIEW OF SYSTEMS  Complete review of systems was performed and was negative except for those items stated in the History of Presenting Illness and Past Medical/Surgical History.    PHYSICAL EXAMINATION  GENERAL:  In no acute distress. Well-developed and well nourished.   SKIN: No rashes or open wounds involving posterior torso, posterior pelvis, lower extremities.  NEUROLOGIC:   Strength: 5/5 bilaterally with hip flexion, knee extension, knee flexion, ankle dorsiflexion, ankle eversion, ankle inversion, ankle plantarflexion, and great toe extension.   Sensation: intact light touch sensation throughout both lower extremities.   Reflexes: intact and symmetric in bilateral lower extremities. Babinski downgoing bilaterally. No clonus.   Gait: able to heel walk, toe walk, and perform tandem gait.   MUSCULOSKELETAL:  Inspection:  Normal alignment of lumbar spine. No shift or scoliosis. Normal posture. Equal iliac crest heights. No pelvic asymmetry.   Palpation: Significant tenderness in the right-sided gluteal muscles including the gluteus medius and gluteus minimus.  ROM: Active lumbar spine ROM is full in flexion and extension, facet loading is negative bilaterally.  Full forward flexion and pulls on the myofascial structures in the low right-sided gluteal area.  Hip provocative maneuvers including AP thigh thrust, Johnathan FADIR are all negative for pain in the groin or the low back.    REVIEW OF PRIOR X-RAYS/STUDIES  Ordered MRI soft tissue pelvis  IMPRESSION/DIAGNOSIS  Encounter Diagnoses   Name Primary?    Buttock pain Yes    Chronic right-sided low back pain without sciatica        TREATMENT/PLAN  Patient has been aggressively treating these complaints with chiropractic therapy, acupuncture as well as home exercises and stretching and anti-inflammatories without relief.  MRI of the pelvis is indicated to evaluate the soft tissue structures in and around stabilizing his hip.  I believe there may be a muscular imbalance between his gluteal muscles and his hip flexors.  Would like him to see a myofascial therapist.    Will follow-up after the MRI is completed.      He is okay to continue with diclofenac, we discussed change to meloxicam however we will keep the diclofenac as is for now.  Welcome to use this with Tylenol.    Education was provided regarding the above impression/diagnosis and treatment options/plan were discussed.  All questions were answered during today's visit.  Patient will contact clinic if any other questions or concerns.            Parminder Cason DO  Interventional Spine and Sports Medicine Specialist   Physical Medicine and Rehabilitation  [address] [address]    [address] [place] [address]

## 2024-09-27 NOTE — PROGRESS NOTES
Scheduled for:  Colonoscopy [address]   Location:  [facility] ([facility])  26+15=41  Provider: Carla Callahan MD    Date:  2/20/2025 Thursday  Time:   7:30 AM (Patient aware ENDO will call the day before their procedure between the hours of 12:00 pm - 5:00 pm with the arrival time)    Sedation:  MAC  Prep:  Split Suprep     Diagnosis with codes:  Colon cancer screening [Z12.11]    Meds/Allergies Reconciled?:   Provider Reviewed   Was patient informed to call insurance with codes (Y/N):  Yes  Referral sent?: Referral was sent at the time of electronic surgical scheduling.  [facility] or [facility] notified?: I sent an electronic request to ENDO Scheduling and received a confirmation today.     Medication Orders:  Patient is aware to NOT take iron pills, herbal meds and diet supplements for 7 days before exam. Also to NOT take any form of alcohol, recreational drugs and any forms of ED meds 24 hours before exam.     Hold Viagra/sildenafil  medication > 3 days (72 hours) prior to procedure    Misc Orders:  N/A   Further instructions given by staff:  I Provided prep instructions to patient and reviewed date, time and location. Patient verbalized that  He / His understood and is aware to call with any questions.  Patient was informed about the new cancellation policy for He / His procedure. Patient was also given copy of the cancellation policy at the time of the appointment and verbalized understanding.

## 2024-10-07 ENCOUNTER — HOSPITAL ENCOUNTER (OUTPATIENT)
Dept: GENERAL RADIOLOGY | Facility: HOSPITAL | Age: 74
Discharge: HOME | End: 2024-10-07
Attending: PHYSICAL MEDICINE & REHABILITATION
Payer: MEDICARE

## 2024-10-07 ENCOUNTER — OFFICE VISIT (OUTPATIENT)
Dept: PHYSICAL MEDICINE AND REHAB | Facility: CLINIC | Age: 74
End: 2024-10-07
Payer: MEDICARE

## 2024-10-07 ENCOUNTER — TELEPHONE (OUTPATIENT)
Dept: PHYSICAL MEDICINE AND REHAB | Facility: CLINIC | Age: 74
End: 2024-10-07

## 2024-10-07 VITALS — WEIGHT: 215 LBS | BODY MASS INDEX: 30.1 KG/M2 | HEIGHT: 71 IN

## 2024-10-07 DIAGNOSIS — M54.50 CHRONIC BILATERAL LOW BACK PAIN WITHOUT SCIATICA: Primary | ICD-10-CM

## 2024-10-07 DIAGNOSIS — M54.50 CHRONIC BILATERAL LOW BACK PAIN WITHOUT SCIATICA: ICD-10-CM

## 2024-10-07 DIAGNOSIS — G89.29 CHRONIC BILATERAL LOW BACK PAIN WITHOUT SCIATICA: Primary | ICD-10-CM

## 2024-10-07 DIAGNOSIS — M54.16 LUMBAR RADICULOPATHY: ICD-10-CM

## 2024-10-07 DIAGNOSIS — G89.29 CHRONIC BILATERAL LOW BACK PAIN WITHOUT SCIATICA: ICD-10-CM

## 2024-10-07 DIAGNOSIS — M47.816 LUMBAR SPONDYLOSIS: ICD-10-CM

## 2024-10-07 PROCEDURE — 99214 OFFICE O/P EST MOD 30 MIN: CPT | Performed by: PHYSICAL MEDICINE & REHABILITATION

## 2024-10-07 PROCEDURE — 72110 X-RAY EXAM L-2 SPINE 4/>VWS: CPT | Performed by: PHYSICAL MEDICINE & REHABILITATION

## 2024-10-07 PROCEDURE — 72190 X-RAY EXAM OF PELVIS: CPT | Performed by: PHYSICAL MEDICINE & REHABILITATION

## 2024-10-07 NOTE — TELEPHONE ENCOUNTER
Received MRI report containing results of MRI of Pelvis, Placed report in  folder by nurse station for review.

## 2024-10-07 NOTE — PROGRESS NOTES
RETURN PATIENT VISIT    CHIEF COMPLAINT  Right low back/right buttock pain    INTERVAL HISTORY  Jim Amin is a 74 year old who was last seen in clinic on 9/27/2024, at that visit patient was complaining primarily of axial low back pain with some wrapping sensation into the right gluteal region and right lateral thigh with tension.  MRI of the pelvis was ordered and patient was sent to a physical therapist working on myofascial treatment and balancing.  Patient continues to complain primarily of pain in the right buttock and right groin worse in the morning current level pain is rated 7/10.    States that he has been doing some home exercises which caused him some pain and tension in the right thigh. He endorses some cramping in the right thigh in the morning when he gets up before he gets moving.     He endorses tightness and tension through the low back into the groin as well as into the right thigh not crossing the knee but does endorse some perineal/scrotal tension as well in the mornings.    REVIEW OF SYSTEMS  Review of systems was completed with the patient today as pertinent to today's visit    PHYSICAL EXAMINATION  CONSTITUTIONAL: Well-appearing, in no apparent distress  EYES: No scleral icterus or conjunctival hemorrhage  CARDIOVASCULAR: Skin warm and well-perfused, no peripheral edema  RESPIRATORY: Breathing unlabored without accessory muscle use  PSYCHIATRIC: Alert, cooperative, appropriate mood and affect  SKIN: No lesions or rashes on exposed skin  MUSCULOSKELETAL: Patient has good range of motion of lumbar spine in flexion extension without exacerbation of complaints.  Facet loading is very minimally positive on the right greater than the left.  NEUROLOGIC: Slump sit negative.  Reflexes are intact and symmetric.  Strength well-maintained in the lower extremities.    REVIEW OF PRIOR X-RAYS/STUDIES  Independently reviewed the MRI of the pelvis which is available in PACS, no clear musculoskeletal  or bony pathology noted on this examination.    Prior MRI also reviewed which did reveal extensive degenerative change with multilevel disc bulging and neuroforaminal narrowing of varying degrees.    IMPRESSION/DIAGNOSIS  1.    Encounter Diagnoses   Name Primary?    Chronic bilateral low back pain without sciatica Yes    Lumbar radiculopathy     Lumbar spondylosis          TREATMENT/PLAN  No clear etiology from MRI of the pelvis, this may be driven primarily by the lumbar spine.  Would like to update his low back and pelvis x-rays as well as an MRI of the lumbar spine as we may find more pathology here which may be amenable to intervention.  Thinking of a diagnostic/therapeutic epidural steroid injection.    Additionally we will have him work with a myofascial therapist as previously recommended.    We will follow-up after the MRI is completed.    Education was provided regarding the above impression/diagnosis and treatment options/plan were discussed.  All questions were answered during today's visit.  Patient will contact clinic if any other questions or concerns.          Parminder Cason DO  Interventional Spine and Sports Medicine Specialist   Physical Medicine and Rehabilitation  [address] [address]    [address] [place] [address]

## 2024-10-08 ENCOUNTER — OFFICE VISIT (OUTPATIENT)
Facility: CLINIC | Age: 74
End: 2024-10-08
Payer: MEDICARE

## 2024-10-08 VITALS — HEIGHT: 71 IN | BODY MASS INDEX: 30.1 KG/M2 | RESPIRATION RATE: 20 BRPM | WEIGHT: 215 LBS

## 2024-10-08 DIAGNOSIS — I77.9 CAROTID ARTERY DISEASE, UNSPECIFIED LATERALITY, UNSPECIFIED TYPE (HCC): ICD-10-CM

## 2024-10-08 DIAGNOSIS — I71.43 INFRARENAL ABDOMINAL AORTIC ANEURYSM (AAA) WITHOUT RUPTURE (HCC): Primary | ICD-10-CM

## 2024-10-08 NOTE — PROGRESS NOTES
Samer F. Najjar, MD  Vascular Surgery  [facility]        VASCULAR SURGERY   CLINIC CONSULT NOTE        Name: Jim Amin   :   1950  [id number]     REFERRING PHYSICIAN:  Ab Campos  PRIMARY CARE PHYSICIAN:  Ab Campos MD    HISTORY OF PRESENT ILLNESS:   Patient is a 74 year old male who has been referred regarding management of a recently discovered AAA seen on ultrasound during a screening event.  The size of the aneurysm was 3.5 cm.  The patient was also noted to have mild to moderate bilateral carotid artery stenosis on a carotid duplex performed in .  He denies any current smoking and is on ASA.    PAST MEDICAL HISTORY:    No past medical history on file.    PAST SURGICAL HISTORY:   Past Surgical History:   Procedure Laterality Date    Inj paravert f jnt l/s 2013    Procedure: LUMBAR FACET INJECTION OR MEDIAL BRANCH NERVE BLOCK;  Surgeon: Jairon Mejia MD;  Location: [facility]    Inj paravert f jnt l/s 1 lev  7/3/2013    Procedure: LUMBAR FACET INJECTION OR MEDIAL BRANCH NERVE BLOCK;  Surgeon: Jairon Mejia MD;  Location: [facility]    Other surgical history          MEDICATIONS:     Current Outpatient Medications:     Na Sulfate-K Sulfate-Mg Sulf (SUPREP BOWEL PREP KIT) 17.5-3.13-1.6 GM/177ML Oral Solution, Take as discussed in clinic, Disp: 1 each, Rfl: 0    diazePAM 5 MG Oral Tab, Take 1 tablet (5 mg total) by mouth daily., Disp: 30 tablet, Rfl: 2    allopurinol 300 MG Oral Tab, Take 1 tablet (300 mg total) by mouth daily., Disp: 90 tablet, Rfl: 2    doxazosin 8 MG Oral Tab, Take 1.5 tablets (12 mg total) by mouth nightly., Disp: 45 tablet, Rfl: 8    albuterol 108 (90 Base) MCG/ACT Inhalation Aero Soln, Inhale 2 puffs into the lungs every 4 (four) hours as needed., Disp: 8.5 g, Rfl: 0    diclofenac 50 MG Oral Tab EC, Take 1 tablet (50 mg total) by mouth 2 (two) times daily with meals., Disp: , Rfl:      sildenafil 20 MG Oral Tab, , Disp: , Rfl:     azelastine 0.1 % Nasal Solution, 1 spray by Nasal route 2 (two) times daily., Disp: 30 mL, Rfl: 2    aspirin 81 MG Oral Chew Tab, Chew 1 tablet (81 mg total) by mouth daily., Disp: , Rfl:     ALLERGIES:    He has No Known Allergies.    SOCIAL HISTORY:    Patient  reports that he has never smoked. He has never used smokeless tobacco. He reports current alcohol use of about 40.0 standard drinks of alcohol per week. He reports that he does not use drugs.    FAMILY HISTORY:    Patient's family history includes Diabetes in his father and mother.    ROS:     A 12 point review of systems with pertinent positives and negatives listed in the HPI.    EXAM:    Resp 20   Ht 5' 11\" (1.803 m)   Wt 215 lb (97.5 kg)   BMI 29.99 kg/m²   GENERAL: alert and orientated X 3, well developed, well nourished, in no apparent distress  PSYCH: normal mood and affect  HEENT: ears and throat are clear  NECK: supple, no lymphadenopathy, thyroid wnl  CAROTID: No bruits  RESPIRATORY: no rales, rhonchi, or wheezes B  CARDIO: RRR without murmur, no murmur, no gallop   ABDOMEN: soft, non-tender with no palpable aneurysm or masses  BACK: normal, no tenderness  SKIN: no rashes, warm and dry  EXTREMITIES: no tenderness  NEURO: no sensory or motor deficits  VASCULAR:      Femoral Popliteal DP PT Peroneal   Right 2+ non-aneurysmal palpable, weak palpable, weak    Left 2+       palpable, weak palpable, weak          IMAGING:       ASSESSMENT  Diagnoses and all orders for this visit:    Infrarenal abdominal aortic aneurysm (AAA) without rupture (HCC)    Carotid artery disease, unspecified laterality, unspecified type (HCC)         I explained to the patient that no intervention is necessary for his small abdominal aortic aneurysm. I reassured the patient that the risk of rupture of the aneurysm at this size is very small as evidence suggests that aneurysms expand at an average rate of 0.3 to 0.4 centimeters  per year. The annual risk of rupture based upon aneurysm size of less than 4.0 cm in diameter is < 0.5 % per year.  Therefore, I would recommend treatment once it reaches a size of 5-5.5 cm. The aneurysm will need to be monitored with yearly ultrasound.   As for his carotid artery disease I reassured him that his stenosis is mild and he is on the appropriate therapy with aspirin, statin and blood pressure control.  We discussed the importance of smoking cessation and BP control.     PLAN:  Serial abdominal and carotid ultrasound in June on a yearly basis which we will arrange for consistency    The patient indicated an understanding of these issues and agreed to the plan and all questions were answered during the clinic visit.      Thank you for allowing me to participate in your patient's care.   Please do not hesitate to contact me with any questions.    Sincerely,  Samer F. Najjar MD    Please note: Dragon speech recognition software was used to prepare this note. If a word or phrase is confusing, it is likely do to a failure of recognition.   Please contact me with any questions or clarifications.

## 2024-10-09 ENCOUNTER — MED REC SCAN ONLY (OUTPATIENT)
Dept: PHYSICAL MEDICINE AND REHAB | Facility: CLINIC | Age: 74
End: 2024-10-09

## 2024-10-09 NOTE — TELEPHONE ENCOUNTER
Radiologist report placed in Dr. Cason's mailbox for his review. Report can be sent to scanning once review is completed.

## 2024-10-14 ENCOUNTER — TELEPHONE (OUTPATIENT)
Dept: PHYSICAL MEDICINE AND REHAB | Facility: CLINIC | Age: 74
End: 2024-10-14

## 2024-10-15 ENCOUNTER — TELEPHONE (OUTPATIENT)
Dept: PHYSICAL MEDICINE AND REHAB | Facility: CLINIC | Age: 74
End: 2024-10-15

## 2024-10-15 ENCOUNTER — OFFICE VISIT (OUTPATIENT)
Dept: PHYSICAL MEDICINE AND REHAB | Facility: CLINIC | Age: 74
End: 2024-10-15
Payer: MEDICARE

## 2024-10-15 VITALS — WEIGHT: 215 LBS | BODY MASS INDEX: 30.1 KG/M2 | HEIGHT: 71 IN

## 2024-10-15 DIAGNOSIS — M54.16 LUMBAR RADICULOPATHY: Primary | ICD-10-CM

## 2024-10-15 PROCEDURE — 99214 OFFICE O/P EST MOD 30 MIN: CPT | Performed by: PHYSICAL MEDICINE & REHABILITATION

## 2024-10-15 NOTE — TELEPHONE ENCOUNTER
Per CMS Guidelines -no authorization is required for L5/S1 ILESI with fluoroscopic guidance  CPT code: 75438       Status: Authorization is not required based on medical necessity however is not a guarantee of payment and may be subject to review once claim is submitted-Covered Benefit.

## 2024-10-15 NOTE — TELEPHONE ENCOUNTER
V2.0  History and Physical      Name:  Jefe Molina /Age/Sex: 1966  (58 y.o. male)   MRN & CSN:  [id number] & [id number] Encounter Date/Time: 2024 8:16 PM EDT   Location:  [address] [place] [address] PCP: No primary care provider on file.       Hospital Day: 1    Assessment and Plan:   Jefe Molina is a 58 y.o. male with a pmh of COPD, tobacco dependency, asthma, alcohol abuse, hypertension who presents with Chest pain at rest    Hospital Problems             Last Modified POA    * (Principal) Chest pain at rest 2024 Yes       Plan:  #Chest pain unspecified currently resolved  - Tachycardic, elevated blood pressure  - Trope negative, BNP negative.  No electrolyte imbalance  - EKG normal sinus rhythm at the rate 100, no ST or T wave changes.  - Will monitor on telemetry    #Alcohol abuse  - Ethanol negative  - No electrolyte abnormality  - Tachycardic has tremor  - CIWA protocol initiated  - Will provide Protonix  - Librium 25 mg 4 times daily  - Will monitor on telemetry    Disposition:   Current Living situation: home  Expected Disposition: home  Estimated D/C: 3    Diet No diet orders on file   DVT Prophylaxis [x] Lovenox, []  Heparin, [] SCDs, [] Ambulation,  [] Eliquis, [] Xarelto, [] Coumadin   Code Status No Order   Surrogate Decision Maker/ POA        History from:     patient    History of Present Illness:     Chief Complaint:   Jefe Molina is a 58 y.o. male with a pmh of COPD, tobacco dependency, asthma, alcohol abuse, hypertension who presents with palpitation, nausea, multiple vomiting episode nonbloody since this afternoon 1.00 pm.  Patient also stated he had alcohol 3 days ago, he drinks until he falls down.  He uses vodka and beer.  He stated had severe tremor, abdominal cramps 9 out of 10 nausea and multiple vomiting episode unable to eat or drink.  He works at [employer] and his vomiting continued at his workplace where he had to call 911.  Denies any  Recv'd another copy- placed in nurses bin.

## 2024-10-15 NOTE — TELEPHONE ENCOUNTER
Patient has been scheduled for L5/S1 Interlaminar epidural steroid injection on 10/28/24 at the [facility] with Dr. Cason.   Anesthesia type:  Local  Please note: The [facility] will call the business day prior to discuss the exact time/arrival and additional instructions for your appointment.  Patient was advised that if he/she does receive the covid vaccine it needs to be at least 2 weeks before or after the injection.  Medications and allergies reviewed.  Educated to hold NSAIDS (Aleve, Ibuprofen, Motrin, Advil) and anti-inflammatories (Meloxicam, Naproxen, Diclofenac, Celebrex) and for cervical injections must hold Multi-Vitamins, Vitamin E, Fish Oil/Omega-3.  Patient informed of [facility]'s  policy:  The patient will require transportation arrangements to and from the procedure, with the  present on site for the entire visit.  Without a , the appointment is subject to cancellation.    [facility] is located in the Critical access hospital 1st floor [address] [facility] [facility], [place] [address].   may park in the yellow/purple parking lot.  Patient verbalized understanding and agrees with plan.  Scheduled in Epic: Yes  Scheduled in Surgical Case: Yes  Follow up appointment made: NOV: Visit date not found

## 2024-10-15 NOTE — PROGRESS NOTES
RETURN PATIENT VISIT    CHIEF COMPLAINT  MRI Follow up on MRI of lumbar spine     INTERVAL HISTORY  Jim Amin is a 74 year old who was last seen in clinic on 10/7/2024 following up on MRI of the lumbar spine.  Patient continues to experience right-sided low back pain with intermittent radiation into the right greater than left lower extremities..  Denies numbness and tingling or weakness.  Currently rates his level pain 6/10.    He currently uses diclofenac as needed as well as Tylenol as needed.    He has difficulty with prolonged standing and prolonged sitting as well as twisting of the trunk.    \REVIEW OF SYSTEMS  Review of systems was completed with the patient today as pertinent to today's visit    PHYSICAL EXAMINATION  CONSTITUTIONAL: Well-appearing, in no apparent distress  EYES: No scleral icterus or conjunctival hemorrhage  CARDIOVASCULAR: Skin warm and well-perfused, no peripheral edema  RESPIRATORY: Breathing unlabored without accessory muscle use  PSYCHIATRIC: Alert, cooperative, appropriate mood and affect  SKIN: No lesions or rashes on exposed skin  MUSCULOSKELETAL: He has restricted range of motion lumbar spine in flexion and extension, ongoing tenderness in the right gluteal musculature.  NEUROLOGIC: He has mildly positive slump sign on the right side.    REVIEW OF PRIOR X-RAYS/STUDIES  Independently reviewed the MRI of the lumbar spine dated 10/10/2024 which reveals degenerative changes most notably from L2-S1, at L3-4 and L4-5 there is broad-based disc bulging resulting in mild foraminal narrowing bilaterally.  Worse on the right than on the left at the L3-4 level, there is also right-sided foraminal narrowing and lateral recess narrowing at L5-S1 worsened since prior examination.    IMPRESSION/DIAGNOSIS  1.    Encounter Diagnosis   Name Primary?    Lumbar radiculopathy Yes       TREATMENT/PLAN    Would like to see if some of the pathology noted in his lumbar spine particular over the  right side has any contributing to radicular pain down into the right lower extremity and into the right buttock musculature.  We discussed interventional options including risks and benefits and he would like to proceed.  We will schedule the patient for a paracentral right L5-S1 interlaminar epidural steroid injection fluoroscopic guidance and local anesthesia.    Patient will follow-up with me for the above injection and then 2 weeks after.    Education was provided regarding the above impression/diagnosis and treatment options/plan were discussed.  All questions were answered during today's visit.  Patient will contact clinic if any other questions or concerns.          Parminder Cason,   Interventional Spine and Sports Medicine Specialist   Physical Medicine and Rehabilitation  [address] [address]    [address] [place] [address]

## 2024-11-05 ENCOUNTER — OFFICE VISIT (OUTPATIENT)
Dept: PHYSICAL MEDICINE AND REHAB | Facility: CLINIC | Age: 74
End: 2024-11-05
Payer: MEDICARE

## 2024-11-05 VITALS — BODY MASS INDEX: 30 KG/M2 | WEIGHT: 215 LBS

## 2024-11-05 DIAGNOSIS — M54.16 LUMBAR RADICULOPATHY: Primary | ICD-10-CM

## 2024-11-05 PROCEDURE — 99214 OFFICE O/P EST MOD 30 MIN: CPT | Performed by: PHYSICAL MEDICINE & REHABILITATION

## 2024-11-05 NOTE — PROGRESS NOTES
RETURN PATIENT VISIT    CHIEF COMPLAINT  Low back and lower extremity radicular pain    INTERVAL HISTORY  Jim Amin is a 74 year old who was last seen in clinic on 10/28/2024 for a and L5-S1 interlaminar epidural steroid injection, last office visit was on 10/15/2024.  Patient states that he had near 70 to 75% relief from his low back complaints for a period of 3 days following the injection however this has waned over the following 3 to 4 days, patient presents with a return of 7/10 pain.  Patient states it is worse in the morning with stiffness for getting out of bed with radiation into the bilateral buttocks and bilateral posterior thighs.    REVIEW OF SYSTEMS  Review of systems was completed with the patient today as pertinent to today's visit    PHYSICAL EXAMINATION  CONSTITUTIONAL: Well-appearing, in no apparent distress  EYES: No scleral icterus or conjunctival hemorrhage  CARDIOVASCULAR: Skin warm and well-perfused, no peripheral edema  RESPIRATORY: Breathing unlabored without accessory muscle use  PSYCHIATRIC: Alert, cooperative, appropriate mood and affect  SKIN: No lesions or rashes on exposed skin  MUSCULOSKELETAL: He has restricted range of motion in extension and flexion, minimal tenderness palpation of lumbar paraspinal musculature.  NEUROLOGIC: Slump sit mildly positive for pull in the low back.    REVIEW OF PRIOR X-RAYS/STUDIES  MRI of the lumbar spine dated 10/10/2024 which reveals degenerative changes most notably from L2-S1, at L3-4 and L4-5 there is broad-based disc bulging resulting in mild foraminal narrowing bilaterally. Worse on the right than on the left at the L3-4 level, there is also right-sided foraminal narrowing and lateral recess narrowing at L5-S1 worsened since prior examination.     IMPRESSION/DIAGNOSIS  1.    Encounter Diagnosis   Name Primary?    Lumbar radiculopathy Yes         TREATMENT/PLAN  Will repeat epidural steroid injection as patient had robust response to the  initial injection however therapeutic benefit did not last.  Additionally will have him speak with a neurosurgeon to discuss his case as he did have a diagnostic response but not a lasting therapeutic benefit.    Recommended he continue with diclofenac as well as Tylenol as needed throughout the day.    Education was provided regarding the above impression/diagnosis and treatment options/plan were discussed.  All questions were answered during today's visit.  Patient will contact clinic if any other questions or concerns.          Parminder Csaon DO  Interventional Spine and Sports Medicine Specialist   Physical Medicine and Rehabilitation  [address] [address]    [address] [place] [address]

## 2024-11-06 ENCOUNTER — TELEPHONE (OUTPATIENT)
Dept: PHYSICAL MEDICINE AND REHAB | Facility: CLINIC | Age: 74
End: 2024-11-06

## 2024-11-06 DIAGNOSIS — M54.16 LUMBAR RADICULOPATHY: Primary | ICD-10-CM

## 2024-11-06 NOTE — TELEPHONE ENCOUNTER
Patient has been scheduled for L5/S1 interlaminar epidural steroid injections on 11/12/24z at the [facility] with Dr. Cason.   Anesthesia type:  Local  Please note: The [facility] will call the business day prior to discuss the exact time/arrival and additional instructions for your appointment.  Patient was advised that if he/she does receive the covid vaccine it needs to be at least 2 weeks before or after the injection.  Medications and allergies reviewed.  Educated to hold NSAIDS (Aleve, Ibuprofen, Motrin, Advil) and anti-inflammatories (Meloxicam, Naproxen, Diclofenac, Celebrex) and for cervical injections must hold Multi-Vitamins, Vitamin E, Fish Oil/Omega-3.  If patient is receiving MAC/IVCS, weight loss oral/injectable medications will need to be held for 7 days prior to injection.  Patient informed to fast 8 hours prior to procedure and 10-12 hours prior to procedure with IVCS/MAC if patient is on a weight loss medication.   If on blood thinner, clearance has been received and approved to hold this medication by provider.   Patient informed of [facility]'s  policy:  The patient will require transportation arrangements to and from the procedure, with the  present on site for the entire visit.  Without a , the appointment is subject to cancellation.    [facility] is located in the [facility] [address] [facility] [address], [place] [address].   may park in the yellow/purple parking lot.  Patient verbalized understanding and agrees with plan.  Scheduled in Epic: Yes  Scheduled in Surgical Case: Yes  Follow up appointment made: NOV: Visit date not found

## 2024-11-06 NOTE — TELEPHONE ENCOUNTER
Per CMS Guidelines -no authorization is required for L5/S1 ILESI with fluoroscopic guidance.  CPT code: 57060       Status: Authorization is not required based on medical necessity however is not a guarantee of payment and may be subject to review once claim is submitted-Covered Benefit.  This will be #2 in a rolling 12 month period

## 2024-11-07 ENCOUNTER — TELEPHONE (OUTPATIENT)
Dept: SURGERY | Facility: CLINIC | Age: 74
End: 2024-11-07

## 2024-11-07 NOTE — TELEPHONE ENCOUNTER
[place] [facility]:  Lumbar 05/02/2013  [facility]: MRI Lumbar Spine 10/10/2024  [facility]: MRI Pelvis 10/03/2024    Patient brought in disc before Monday 11/11/2024 visit, disc will be uploaded into PACS and returned to patient day of visit.

## 2024-11-11 ENCOUNTER — OFFICE VISIT (OUTPATIENT)
Dept: SURGERY | Facility: CLINIC | Age: 74
End: 2024-11-11
Payer: MEDICARE

## 2024-11-11 VITALS
HEIGHT: 71 IN | BODY MASS INDEX: 30.1 KG/M2 | WEIGHT: 215 LBS | SYSTOLIC BLOOD PRESSURE: 110 MMHG | DIASTOLIC BLOOD PRESSURE: 72 MMHG | HEART RATE: 82 BPM

## 2024-11-11 DIAGNOSIS — M54.16 LUMBAR RADICULOPATHY: Primary | ICD-10-CM

## 2024-11-11 DIAGNOSIS — M53.2X6 SPINAL INSTABILITY OF LUMBAR REGION: ICD-10-CM

## 2024-11-11 DIAGNOSIS — M48.061 STENOSIS OF LATERAL RECESS OF LUMBAR SPINE: ICD-10-CM

## 2024-11-11 DIAGNOSIS — M43.16 SPONDYLOLISTHESIS OF LUMBAR REGION: ICD-10-CM

## 2024-11-11 DIAGNOSIS — M48.061 LUMBAR FORAMINAL STENOSIS: ICD-10-CM

## 2024-11-11 DIAGNOSIS — M47.816 LUMBAR SPONDYLOSIS: ICD-10-CM

## 2024-11-11 PROCEDURE — 99205 OFFICE O/P NEW HI 60 MIN: CPT | Performed by: STUDENT IN AN ORGANIZED HEALTH CARE EDUCATION/TRAINING PROGRAM

## 2024-11-11 RX ORDER — ACETAMINOPHEN 500 MG
2 TABLET ORAL EVERY 8 HOURS
COMMUNITY
Start: 2023-06-30

## 2024-11-11 RX ORDER — AMOXICILLIN 250 MG
2 CAPSULE ORAL 2 TIMES DAILY
COMMUNITY
Start: 2023-06-30

## 2024-11-11 RX ORDER — MULTIVITAMIN
1 TABLET ORAL DAILY
COMMUNITY

## 2024-11-11 NOTE — H&P
[facility]  Neurological Surgery New Patient Clinic Note    Jim Amin  8/9/1950  [id number]  PCP: Ab Campos MD  Referring Provider: Parminder Cason DO    REASON FOR VISIT:  Lumbar radiculopathy    HISTORY OF PRESENT ILLNESS 11/11/2024:  Jim Amin is a(n) 74 year old male who presents with a 10-year history of chronic lower back pain, initially triggered by an injury while moving furniture in [id number]. The pain is localized to the lower right back and radiates to the groin, buttocks, and lateral thigh. He describes the sensation as akin to having a \"rock in a bathtub\" and reports significant morning stiffness with difficulty rising from bed. The pain improves after sitting for about 20 minutes but is exacerbated by prolonged standing, sitting, and twisting movements.    Over the years, Jim has tried various treatments, including nerve ablation (approximately 8-9 years ago), acupuncture with electrostimulation, chiropractic manipulation, and physical therapy focused on myofascial release and balance. Acupuncture provided temporary relief lasting about a month per session. He enjoys golfing but has difficulty swinging due to pain and stiffness, requiring medications to play and often needing to stop after 12-13 holes due to inability to fully rotate his trunk.    He reports that stretching forward helps alleviate his pain, while backward extension exacerbates it. He denies any radiating pain down the legs, numbness, tingling, or weakness in the lower extremities. He also notes aching in the groin area upon waking, which improves with movement.    He had an MRI 10 years ago showing degenerative changes, and a recent MRI indicates worsening of these conditions. He is open to surgical intervention if it offers relief. He received an L5-S1 interlaminar epidural steroid injection on 10/28/2024, providing temporary 70-75% pain relief that lasted three days. Current  management includes as-needed diclofenac and Tylenol.    PAST MEDICAL HISTORY:  No past medical history on file.  Gout (on allopurinol)  Urinary issues (on doxazosin)  No history of hypertension, diabetes, or other chronic conditions reported    PAST SURGICAL HISTORY:  Past Surgical History:   Procedure Laterality Date    Inj griselda traore jnt l/s 1 lev  6/18/2013    Procedure: LUMBAR FACET INJECTION OR MEDIAL BRANCH NERVE BLOCK;  Surgeon: Jairon Mejia MD;  Location: [facility]    Inj paravert f jnt l/s 1 lev  7/3/2013    Procedure: LUMBAR FACET INJECTION OR MEDIAL BRANCH NERVE BLOCK;  Surgeon: Jairon Mejia MD;  Location: [facility]    Other surgical history     Status post shoulder replacement surgery    FAMILY HISTORY:  family history includes Diabetes in his father and mother.    SOCIAL HISTORY:   reports that he has never smoked. He has never used smokeless tobacco. He reports current alcohol use of about 40.0 standard drinks of alcohol per week. He reports that he does not use drugs.  Works in the [employer] industry; on his feet frequently  Exercises four days a week; enjoys walking and golfing  ; lives with his wife  Denies tobacco or illicit drug use    ALLERGIES:  Allergies[1]    MEDICATIONS:  Medications Ordered Prior to Encounter[2]    REVIEW OF SYSTEMS:  All other systems were reviewed and were negative except for those previously mentioned in the HPI    PHYSICAL EXAMINATION:  General: No acute distress.  Respiratory: Non-labored respirations bilaterally. No audible wheezing  Cardiovascular: Extremities warm and well-perfused.  Abdomen: Soft, nontender, nondistended.   Musculoskeletal: Moves all extremities well, symmetrically.  Extremities: No edema.  Back Examination:  Inspection: Normal spinal alignment; no visible deformities.  Palpation: Tenderness over the right lower lumbar region and right sacroiliac (SI) joint area.  Range of Motion: Limited  trunk rotation and extension due to pain; forward flexion provides some relief.    NEUROLOGIC EXAMINATION:  Mental status: Alert and oriented x 3  Speech: Clear, fluent  Cranial nerves: PERRLA, EOMI, face symmetric, with normal strength and sensation, tongue and palate midline, SCM 5/5 bilaterally  Motor:     RIGHT  Delt 5/5   Bic 5/5  Tri 5/5   HI 5/5    5/5  IP 5/5   Quad 5/5   Ham 5/5   AT 5/5   EHL 5/5 Syed 5/5     LEFT    Delt 5/5   Bic 5/5  Tri 5/5   HI 5/5    5/5  IP 5/5   Quad 5/5   Ham 5/5   AT 5/5   EHL 5/5 Syed 5/5   No pronator drift  Tone: Normal  Atrophy/Fasciculations: None  Sensation: Normal to light touch, symmetric, no neglect  Cerebellar: Normal finger nose finger  Gait: Normal, nondistressed heel toe tandem gait      Reflexes: 2+ throughout, symmetric, no Nicky's  Fabers: Positive on right side.  Straight Leg Raise: Negative bilaterally.    IMAGING:  XR lumbar flexion-extension: Diffuse spondylotic changes throughout the lumbar spine, most pronounced at L3-4 and L4-5.  Grade 1 L4-5 spondylolisthesis without evidence of dynamic instabilities.    MRI lumbar 10/10/2024: Diffuse spondylotic changes throughout the lumbar spine, most pronounced at L3-4 and L4-5 with due to combination of degenerative disc disease, grade 1 L4-5 spondylolisthesis, there is moderate to severe right L3-4 foraminal stenosis, moderate bilateral L3-4 and L4-5 lateral recess stenosis.        ASSESSMENT:  Jim presents with chronic lower back pain likely due to degenerative changes in the lumbar spine, particularly at L3-4 and L4-5. His symptoms suggest possible right L3 nerve root involvement, correlating with his groin and anterior thigh pain. The absence of lower extremity numbness, tingling, or weakness suggests that radiculopathy is not prominent. Imaging studies reveal significant degenerative disc disease, foraminal stenosis, and spondylolisthesis, which may contribute to his pain. Conservative treatments  have provided only temporary relief. His pain pattern and physical examination indicate that both the lumbar spine and SI joint may be contributing to his symptoms. I will coordinate with Dr. Cason to perform a diagnostic right L3 selective nerve root block to determine if the L3 nerve root is the primary pain generator. If the diagnostic block provides significant relief, consider a therapeutic steroid injection at the right L3 nerve root.  If the right L3 nerve root is indeed confirmed to be the pain generator, and surgery is under consideration then we will proceed with obtaining a CT scan of the lumbar spine to assess bony structures, facet joints, and any possible pars defects not visible on MRI and full-length scoliosis X-rays to evaluate spinal alignment and sagittal balance, which may influence treatment decisions.  I advised him to continue physical activity focusing on core strengthening, flexibility, and stabilization exercises and to incorporate exercises targeting the SI joint and hip mobility.    Plan:  Coordinate with Dr. Cason to perform a diagnostic right L3 selective nerve root block to determine if the L3 nerve root is the primary pain generator.  If the diagnostic block provides significant relief, consider a therapeutic steroid injection at the right L3 nerve root.  Will eventually obtain a CT scan of the lumbar spine to assess bony structures, facet joints, and any possible pars defects not visible on MRI.  Will eventually order full-length scoliosis X-rays to evaluate spinal alignment and sagittal balance, which may influence treatment decisions.  Continue physical therapy focusing on core strengthening, flexibility, and stabilization exercises.  Incorporate exercises targeting the SI joint and hip mobility.  Continue NSAIDs (diclofenac) and acetaminophen (Tylenol) as needed for pain control.  If conservative measures fail and diagnostic injections confirm the pain source, discuss the  potential benefits and risks of surgical intervention. Possible surgical options include right L3-4 decompression via facetectomy and consideration for fusion.   Schedule a follow-up appointment after the diagnostic injection to reassess symptoms and adjust the treatment plan accordingly.    Dickson Cao MD  Neurological Surgery    [address], [address]  [place] [address]  [phone]  Pager [phone]  11/11/2024 10:46 AM      This note was created using a voice-recognition transcribing system. Incorrect words or phrases may have been missed during proofreading. Please interpret accordingly.    Total Time    New Patient Total Time       60  minutes.      Activities       Preparing to see the patient (chart/tests/imaging review).       Obtaining and/or reviewing separately obtained history.       Performing a medically appropriate examination and/or evaluation.       Counseling and educating the patient/family/caregiver.       Ordering medications, tests, or procedures.       Referring and communicating with other health care professionals (when not separately reported).       Documenting clincal information in the electronic or other health record.       Independently interpreting results (not separately reported).    Communicating results to the patient/family/caregiver.    Care coordination (not separately reported).       [1] No Known Allergies  [2]   Current Outpatient Medications on File Prior to Visit   Medication Sig Dispense Refill    vitamin E 1000 UNITS Oral Cap Take 1,000 Units by mouth daily.      omega-3 fatty acids 1000 MG Oral Cap Take 1,000 mg by mouth daily.      doxazosin 8 MG Oral Tab Take 8 mg by mouth nightly.      Na Sulfate-K Sulfate-Mg Sulf (SUPREP BOWEL PREP KIT) 17.5-3.13-1.6 GM/177ML Oral Solution Take as discussed in clinic 1 each 0    diazePAM 5 MG Oral Tab Take 1 tablet (5 mg total) by mouth daily. 30 tablet 2     allopurinol 300 MG Oral Tab Take 1 tablet (300 mg total) by mouth daily. 90 tablet 2    albuterol 108 (90 Base) MCG/ACT Inhalation Aero Soln Inhale 2 puffs into the lungs every 4 (four) hours as needed. 8.5 g 0    diclofenac 50 MG Oral Tab EC Take 1 tablet (50 mg total) by mouth 2 (two) times daily with meals.      sildenafil 20 MG Oral Tab       azelastine 0.1 % Nasal Solution 1 spray by Nasal route 2 (two) times daily. 30 mL 2    aspirin 81 MG Oral Chew Tab Chew 1 tablet (81 mg total) by mouth daily.       No current facility-administered medications on file prior to visit.

## 2024-11-11 NOTE — PROGRESS NOTES
New patient:  Reason for visit: lumbar pain     Estimated time of onset:  years     Numeric Rating Scale:    Pain at Present:  8/10                                                                                                                       Distribution of Pain:    bilateral states he has no N/T in legs or feet     Past Treatments for Current Pain Condition:     States he has a injection tomorrow with Dr Cason and he has on two weeks   good for about four days.  No passed PT   No passed back SX   Response to treatment: some relief    Prior diagnostic testing related to this condition:      Imaging was uploaded in chart

## 2024-11-11 NOTE — PATIENT INSTRUCTIONS
Refill policies:    Allow 2-3 business days for refills; controlled substances may take longer.  Contact your pharmacy at least 5 days prior to running out of medication and have them send an electronic request or submit request through the “request refill” option in your [employer] account.  Refills are not addressed on weekends; covering physicians do not authorize routine medications on weekends.  No narcotics or controlled substances are refilled after noon on Fridays or by on call physicians.  By law, narcotics must be electronically prescribed.  A 30 day supply with no refills is the maximum allowed.  If your prescription is due for a refill, you may be due for a follow up appointment.  To best provide you care, patients receiving routine medications need to be seen at least once a year.  Patients receiving narcotic/controlled substance medications need to be seen at least once every 3 months.  In the event that your preferred pharmacy does not have the requested medication in stock (e.g. Backordered), it is your responsibility to find another pharmacy that has the requested medication available.  We will gladly send a new prescription to that pharmacy at your request.    Scheduling Tests:    If your physician has ordered radiology tests such as MRI or CT scans, please contact Central Scheduling at [phone] right away to schedule the test.  Once scheduled, the [facility] Centralized Referral Team will work with your insurance carrier to obtain pre-certification or prior authorization.  Depending on your insurance carrier, approval may take 3-10 days.  It is highly recommended patients assure they have received an authorization before having a test performed.  If test is done without insurance authorization, patient may be responsible for the entire amount billed.      Precertification and Prior Authorizations:  If your physician has recommended that you have a procedure or additional testing performed the [facility]  Centralized Referral Team will contact your insurance carrier to obtain pre-certification or prior authorization.    You are strongly encouraged to contact your insurance carrier to verify that your procedure/test has been approved and is a COVERED benefit.  Although the [facility] Centralized Referral Team does its due diligence, the insurance carrier gives the disclaimer that \"Although the procedure is authorized, this does not guarantee payment.\"    Ultimately the patient is responsible for payment.   Thank you for your understanding in this matter.  Paperwork Completion:  If you require FMLA or disability paperwork for your recovery, please make sure to either drop it off or have it faxed to our office at [phone]. Be sure the form has your name and date of birth on it.  The form will be faxed to our Forms Department and they will complete it for you.  There is a 25$ fee for all forms that need to be filled out.  Please be aware there is a 10-14 day turnaround time.  You will need to sign a release of information (EMILEE) form if your paperwork does not come with one.  You may call the Forms Department with any questions at [phone].  Their fax number is [phone].

## 2024-11-11 NOTE — TELEPHONE ENCOUNTER
Order has been changed to Right L3/4 TFESI with fluoroscopic guidance, local anesthesia     Per CMS Guidelines -no authorization is required for Right L3/4 TFESI with fluoroscopic guidance, local anesthesia   CPT code: 34619       Status: Authorization is not required based on medical necessity however is not a guarantee of payment and may be subject to review once claim is submitted-Covered Benefit.  This will be #2 in a rolling 12 month period

## 2024-11-11 NOTE — PROGRESS NOTES
New patient:  Reason for visit: lumbar pain      Estimated time of onset:  years      Numeric Rating Scale:    Pain at Present:  8/10                                                                                                                       Distribution of Pain:    bilateral states he has no N/T in legs or feet      Past Treatments for Current Pain Condition:     States he has a injection tomorrow with Dr Cason and he has on two weeks   good for about four days.   passed PT   No passed back SX   Response to treatment: some relief     Prior diagnostic testing related to this condition:       Imaging was uploaded in chart

## 2024-11-18 ENCOUNTER — TELEPHONE (OUTPATIENT)
Dept: PHYSICAL MEDICINE AND REHAB | Facility: CLINIC | Age: 74
End: 2024-11-18

## 2024-11-18 NOTE — TELEPHONE ENCOUNTER
Patient had Right L3 transforaminal epidural steroid injection on 11/12/24. Per patient the L5-S1 area pain is dull now, but he is still having pain there. Pain at that level prior to injection 8/10, pain is now 4/10. Patient has also taken a diclofenac the past 2 days which has helped.     Patient has also been taking it easy since the injection, so he is wondering how much and what exercises he can do now.    Next office visit: none scheduled.    Patient update forwarded on to  for feedback.

## 2024-11-19 NOTE — TELEPHONE ENCOUNTER
Incoming call from patient - pt verbalized understanding of Dr. Cason's recommendations. Declines PT order - will continue with HEP and update us after awhile.

## 2024-11-20 ENCOUNTER — TELEPHONE (OUTPATIENT)
Dept: INTERNAL MEDICINE CLINIC | Facility: CLINIC | Age: 74
End: 2024-11-20

## 2024-11-20 NOTE — TELEPHONE ENCOUNTER
Pt called requesting refills for allopurinol 300 MG Oral Tab   albuterol 108 (90 Base) MCG/ACT Inhalation Aero Soln

## 2024-11-21 RX ORDER — ALLOPURINOL 300 MG/1
300 TABLET ORAL DAILY
Qty: 90 TABLET | Refills: 0 | Status: SHIPPED | OUTPATIENT
Start: 2024-11-21

## 2024-11-25 RX ORDER — ALBUTEROL SULFATE 90 UG/1
2 INHALANT RESPIRATORY (INHALATION) EVERY 4 HOURS PRN
Qty: 8.5 G | Refills: 0 | Status: SHIPPED | OUTPATIENT
Start: 2024-11-25

## 2024-11-25 NOTE — TELEPHONE ENCOUNTER
Patient calling for update on refill request for albuterol 108 (90 Base) MCG/ACT Inhalation Aero Soln

## 2024-11-26 NOTE — TELEPHONE ENCOUNTER
Please see medication list, las refill given for allopurinol 11/21/2024 qty 90. Albuterol was sent today.

## 2024-12-06 DIAGNOSIS — M54.50 CHRONIC BILATERAL LOW BACK PAIN WITHOUT SCIATICA: Primary | ICD-10-CM

## 2024-12-06 DIAGNOSIS — G89.29 CHRONIC BILATERAL LOW BACK PAIN WITHOUT SCIATICA: Primary | ICD-10-CM

## 2024-12-06 DIAGNOSIS — M54.16 LUMBAR RADICULOPATHY: ICD-10-CM

## 2024-12-06 RX ORDER — TRAMADOL HYDROCHLORIDE 50 MG/1
50 TABLET ORAL EVERY 8 HOURS PRN
Qty: 36 TABLET | Refills: 0 | Status: SHIPPED | OUTPATIENT
Start: 2024-12-06

## 2024-12-09 ENCOUNTER — OFFICE VISIT (OUTPATIENT)
Dept: SURGERY | Facility: CLINIC | Age: 74
End: 2024-12-09
Payer: MEDICARE

## 2024-12-09 VITALS
HEART RATE: 71 BPM | BODY MASS INDEX: 30.1 KG/M2 | HEIGHT: 71 IN | SYSTOLIC BLOOD PRESSURE: 127 MMHG | WEIGHT: 215 LBS | OXYGEN SATURATION: 97 % | DIASTOLIC BLOOD PRESSURE: 80 MMHG

## 2024-12-09 DIAGNOSIS — M54.16 LUMBAR RADICULOPATHY: ICD-10-CM

## 2024-12-09 DIAGNOSIS — M48.061 LUMBAR FORAMINAL STENOSIS: Primary | ICD-10-CM

## 2024-12-09 DIAGNOSIS — M43.16 SPONDYLOLISTHESIS OF LUMBAR REGION: ICD-10-CM

## 2024-12-09 DIAGNOSIS — M47.816 LUMBAR SPONDYLOSIS: ICD-10-CM

## 2024-12-09 DIAGNOSIS — M48.061 STENOSIS OF LATERAL RECESS OF LUMBAR SPINE: ICD-10-CM

## 2024-12-09 DIAGNOSIS — M53.2X6 SPINAL INSTABILITY OF LUMBAR REGION: ICD-10-CM

## 2024-12-09 PROCEDURE — 99214 OFFICE O/P EST MOD 30 MIN: CPT | Performed by: STUDENT IN AN ORGANIZED HEALTH CARE EDUCATION/TRAINING PROGRAM

## 2024-12-09 NOTE — PROGRESS NOTES
[facility]  Neurological Surgery New Patient Clinic Note    Jim Amin  8/9/1950  [id number]  PCP: Ab Campos MD  Referring Provider: Parminder Cason DO    REASON FOR VISIT:  Lumbar radiculopathy    HISTORY OF PRESENT ILLNESS 11/11/2024:  Jim Amin is a(n) 74 year old male who presents with a 10-year history of chronic lower back pain, initially triggered by an injury while moving furniture in [id number]. The pain is localized to the lower right back and radiates to the groin, buttocks, and lateral thigh. He describes the sensation as akin to having a \"rock in a bathtub\" and reports significant morning stiffness with difficulty rising from bed. The pain improves after sitting for about 20 minutes but is exacerbated by prolonged standing, sitting, and twisting movements.    Over the years, Jim has tried various treatments, including nerve ablation (approximately 8-9 years ago), acupuncture with electrostimulation, chiropractic manipulation, and physical therapy focused on myofascial release and balance. Acupuncture provided temporary relief lasting about a month per session. He enjoys golfing but has difficulty swinging due to pain and stiffness, requiring medications to play and often needing to stop after 12-13 holes due to inability to fully rotate his trunk.    He reports that stretching forward helps alleviate his pain, while backward extension exacerbates it. He denies any radiating pain down the legs, numbness, tingling, or weakness in the lower extremities. He also notes aching in the groin area upon waking, which improves with movement.    He had an MRI 10 years ago showing degenerative changes, and a recent MRI indicates worsening of these conditions. He is open to surgical intervention if it offers relief. He received an L5-S1 interlaminar epidural steroid injection on 10/28/2024, providing temporary 70-75% pain relief that lasted three days. Current  management includes as-needed diclofenac and Tylenol.    INTERVAL HISTORY 12/9/2024:  Since the patient’s last visit approximately one month ago, he reports a period of significantly reduced pain following the right L3 transforaminal epidural steroid injection. He experienced about two weeks of improved pain tolerance and reduced reliance on oral analgesics. Although low back soreness intermittently returns, it is notably less intense and more transient than before. He continues regular exercise, including strengthening and flexibility routines, and has successfully resumed most daily activities without requiring continuous pain medication. No new neurological deficits are reported, and he remains stable from a systemic standpoint.    PAST MEDICAL HISTORY:  No past medical history on file.  Gout (on allopurinol)  Urinary issues (on doxazosin)  No history of hypertension, diabetes, or other chronic conditions reported    PAST SURGICAL HISTORY:  Past Surgical History:   Procedure Laterality Date    Inj paravert f jnt l/s 1 lev  6/18/2013    Procedure: LUMBAR FACET INJECTION OR MEDIAL BRANCH NERVE BLOCK;  Surgeon: Jairon Mejia MD;  Location: [facility]    Inj paravert f jnt l/s 1 lev  7/3/2013    Procedure: LUMBAR FACET INJECTION OR MEDIAL BRANCH NERVE BLOCK;  Surgeon: Jairon Mejia MD;  Location: [facility]    Other surgical history     Status post shoulder replacement surgery    FAMILY HISTORY:  family history includes Diabetes in his father and mother.    SOCIAL HISTORY:   reports that he has never smoked. He has never used smokeless tobacco. He reports current alcohol use of about 40.0 standard drinks of alcohol per week. He reports that he does not use drugs.  Works in the [employer]; on his feet frequently  Exercises four days a week; enjoys walking and golfing  ; lives with his wife  Denies tobacco or illicit drug  use    ALLERGIES:  Allergies[1]    MEDICATIONS:  Medications Ordered Prior to Encounter[2]    REVIEW OF SYSTEMS:  All other systems were reviewed and were negative except for those previously mentioned in the HPI    PHYSICAL EXAMINATION:  General: No acute distress.  Respiratory: Non-labored respirations bilaterally. No audible wheezing  Cardiovascular: Extremities warm and well-perfused.  Abdomen: Soft, nontender, nondistended.   Musculoskeletal: Moves all extremities well, symmetrically.  Extremities: No edema.  Back Examination:  Inspection: Normal spinal alignment; no visible deformities.  Palpation: Tenderness over the right lower lumbar region and right sacroiliac (SI) joint area.  Range of Motion: Limited trunk rotation and extension due to pain; forward flexion provides some relief.    NEUROLOGIC EXAMINATION:  Mental status: Alert and oriented x 3  Speech: Clear, fluent  Cranial nerves: PERRLA, EOMI, face symmetric, with normal strength and sensation, tongue and palate midline, SCM 5/5 bilaterally  Motor:     RIGHT  Delt 5/5   Bic 5/5  Tri 5/5   HI 5/5    5/5  IP 5/5   Quad 5/5   Ham 5/5   AT 5/5   EHL 5/5 Syed 5/5     LEFT    Delt 5/5   Bic 5/5  Tri 5/5   HI 5/5    5/5  IP 5/5   Quad 5/5   Ham 5/5   AT 5/5   EHL 5/5 Syed 5/5   No pronator drift  Tone: Normal  Atrophy/Fasciculations: None  Sensation: Normal to light touch, symmetric, no neglect  Cerebellar: Normal finger nose finger  Gait: Normal, nondistressed heel toe tandem gait      Reflexes: 2+ throughout, symmetric, no Nicky's  Fabers: Positive on right side.  Straight Leg Raise: Negative bilaterally.    IMAGING:  XR lumbar flexion-extension: Diffuse spondylotic changes throughout the lumbar spine, most pronounced at L3-4 and L4-5.  Grade 1 L4-5 spondylolisthesis without evidence of dynamic instabilities.    MRI lumbar 10/10/2024: Diffuse spondylotic changes throughout the lumbar spine, most pronounced at L3-4 and L4-5 with due to  combination of degenerative disc disease, grade 1 L4-5 spondylolisthesis, there is moderate to severe right L3-4 foraminal stenosis, moderate bilateral L3-4 and L4-5 lateral recess stenosis.        ASSESSMENT:  Jim’s intermittent low back pain and occasional sharp discomfort in the right lower lumbar area appear related to degenerative changes at L3-4 and L4-5, with likely nerve root irritation contributing to his symptom pattern. The recent diagnostic and therapeutic injection at L3 suggests a positive response, indicating that targeted anti-inflammatory intervention can provide meaningful relief. Although underlying structural issues persist, the patient’s improved pain profile, stable functional status, and ongoing engagement in a strengthening program support continued conservative management rather than immediate surgical intervention. As he can tolerate exercise and daily activities without escalating pain, and no red flags are present, maintaining this non-operative course is appropriate at this time.    Plan:  - Continue conservative management, including a structured exercise regimen focused on core strengthening, hip mobility, and “posterior chain” muscle engagement.  - Advised the patient to use NSAIDs (e.g., diclofenac) and/or acetaminophen as needed for occasional flare-ups, reserving tramadol for more severe pain episodes.  - Encouraged ongoing regular physical activity, including low-impact aerobic exercise and progressive resistance training, as tolerated without provoking severe symptoms.  - Monitor symptom trends: If pain escalates, consider a repeat targeted epidural injection after an appropriate interval, per pain management guidelines.  - No imaging or surgical consultation changes at this time; reevaluate in three to six months to assess the stability of symptoms and functional capacity.  - Reinforced the importance of maintaining muscle strength and flexibility to potentially slow the  progression of degenerative changes and preserve quality of life.    Dickson Cao MD  Neurological Surgery    [facility]  [address], [address]  [place] [address]  [phone]  Pager [phone]  12/9/2024 10:00 AM      This note was created using a voice-recognition transcribing system. Incorrect words or phrases may have been missed during proofreading. Please interpret accordingly.    Total Time    Established Patient Total Time       30  minutes.      Activities       Preparing to see the patient (chart/tests/imaging review).       Obtaining and/or reviewing separately obtained history.       Performing a medically appropriate examination and/or evaluation.       Counseling and educating the patient/family/caregiver.       Ordering medications, tests, or procedures.       Referring and communicating with other health care professionals (when not separately reported).       Documenting clincal information in the electronic or other health record.       Independently interpreting results (not separately reported).    Communicating results to the patient/family/caregiver.    Care coordination (not separately reported).       [1] No Known Allergies  [2]   Current Outpatient Medications on File Prior to Visit   Medication Sig Dispense Refill    traMADol 50 MG Oral Tab Take 1 tablet (50 mg total) by mouth every 8 (eight) hours as needed for Pain. 36 tablet 0    ALBUTEROL 108 (90 Base) MCG/ACT Inhalation Aero Soln INHALE 2 PUFFS INTO THE LUNGS EVERY 4 HOURS AS NEEDED 8.5 g 0    allopurinol 300 MG Oral Tab Take 1 tablet (300 mg total) by mouth daily. 90 tablet 0    acetaminophen 500 MG Oral Tab Take 2 tablets (1,000 mg total) by mouth every 8 (eight) hours.      MAGNESIUM OR Take 1 tablet by mouth daily. (Patient not taking: Reported on 11/11/2024)      Multiple Vitamin (MULTI-VITAMIN) Oral Tab Take 1 tablet by mouth daily. (Patient not taking: Reported on  11/11/2024)      sennosides-docusate 8.6-50 MG Oral Tab Take 2 tablets by mouth 2 (two) times daily.      vitamin E 1000 UNITS Oral Cap Take 1,000 Units by mouth daily.      omega-3 fatty acids 1000 MG Oral Cap Take 1,000 mg by mouth daily.      doxazosin 8 MG Oral Tab Take 1 tablet (8 mg total) by mouth nightly.      Na Sulfate-K Sulfate-Mg Sulf (SUPREP BOWEL PREP KIT) 17.5-3.13-1.6 GM/177ML Oral Solution Take as discussed in clinic 1 each 0    diazePAM 5 MG Oral Tab Take 1 tablet (5 mg total) by mouth daily. (Patient not taking: Reported on 11/11/2024) 30 tablet 2    diclofenac 50 MG Oral Tab EC Take 1 tablet (50 mg total) by mouth 2 (two) times daily with meals.      sildenafil 20 MG Oral Tab       azelastine 0.1 % Nasal Solution 1 spray by Nasal route 2 (two) times daily. 30 mL 2    aspirin 81 MG Oral Chew Tab Chew 1 tablet (81 mg total) by mouth daily. (Patient not taking: Reported on 11/11/2024)       No current facility-administered medications on file prior to visit.

## 2024-12-17 ENCOUNTER — OFFICE VISIT (OUTPATIENT)
Dept: PHYSICAL MEDICINE AND REHAB | Facility: CLINIC | Age: 74
End: 2024-12-17
Payer: MEDICARE

## 2024-12-17 ENCOUNTER — TELEPHONE (OUTPATIENT)
Dept: PHYSICAL MEDICINE AND REHAB | Facility: CLINIC | Age: 74
End: 2024-12-17

## 2024-12-17 VITALS — WEIGHT: 215 LBS | HEIGHT: 71 IN | BODY MASS INDEX: 30.1 KG/M2

## 2024-12-17 DIAGNOSIS — M47.816 LUMBAR SPONDYLOSIS: Primary | ICD-10-CM

## 2024-12-17 DIAGNOSIS — M79.18 BUTTOCK PAIN: ICD-10-CM

## 2024-12-17 DIAGNOSIS — M54.16 LUMBAR RADICULOPATHY: ICD-10-CM

## 2024-12-17 DIAGNOSIS — M54.50 CHRONIC BILATERAL LOW BACK PAIN WITHOUT SCIATICA: ICD-10-CM

## 2024-12-17 DIAGNOSIS — G89.29 CHRONIC BILATERAL LOW BACK PAIN WITHOUT SCIATICA: ICD-10-CM

## 2024-12-17 PROCEDURE — 99214 OFFICE O/P EST MOD 30 MIN: CPT | Performed by: PHYSICAL MEDICINE & REHABILITATION

## 2024-12-17 NOTE — PROGRESS NOTES
RETURN PATIENT VISIT    CHIEF COMPLAINT  Low back pain and stiffness    INTERVAL HISTORY  Jim Amin is a 74 year old who was last seen in clinic on 11/12/2024, at that visit patient underwent a right-sided L3 transforaminal epidural steroid injection.  He has since followed up with Dr. Cao from neurosurgery where recommendations for ongoing conservative care were put into place.    He returns today stating that overall he has had about 60% relief from this injection, denies numbness and tingling, continues to use diclofenac as needed.  Not currently physical therapy but he does participate in home exercise program.  His current level of pain is rated 6/10.    He has been doing some gluteal strength and focusing on core and low back.     Patient Dors is pretty significant stiffness in the morning, he has a burning sensation bilaterally in the low back, he has difficulty releasing his golf swing on the right side when he tries to extend and rotate through.    He has improved his ability to carry and lift items with less pain but continues to experience daily pain which can be as severe 6/10 especially after activity or exercise.    REVIEW OF SYSTEMS  Review of systems was completed with the patient today as pertinent to today's visit    PHYSICAL EXAMINATION  CONSTITUTIONAL: Well-appearing, in no apparent distress  EYES: No scleral icterus or conjunctival hemorrhage  CARDIOVASCULAR: Skin warm and well-perfused, no peripheral edema  RESPIRATORY: Breathing unlabored without accessory muscle use  PSYCHIATRIC: Alert, cooperative, appropriate mood and affect  SKIN: No lesions or rashes on exposed skin  MUSCULOSKELETAL: Positive facet loading on the right side, some extension bias low back pain generally speaking.  Negative neural tension signs.  NEUROLOGIC: Able strength and sensation of the bilateral lower EXTR    REVIEW OF PRIOR X-RAYS/STUDIES  MRI of the lumbar spine dated 10/10/2024 which reveals  degenerative changes most notably from L2-S1, at L3-4 and L4-5 there is broad-based disc bulging resulting in mild foraminal narrowing bilaterally. Worse on the right than on the left at the L3-4 level, there is also right-sided foraminal narrowing and lateral recess narrowing at L5-S1 worsened since prior examination.     IMPRESSION/DIAGNOSIS  1.    Encounter Diagnoses   Name Primary?    Lumbar spondylosis Yes    Buttock pain     Lumbar radiculopathy     Chronic bilateral low back pain without sciatica          TREATMENT/PLAN  We may have 2 pain generators, he clearly responded better to the right-sided L3 transforaminal epidural steroid injection and some of this referred pain and radiating pain into the right groin and thigh and low back has improved after injection.  He continues to experience extension bias axial low back pain low in the lumbar spine which bothers him with golfing as well as with exercise.    Around 10 years ago he did have positive medial branch blocks however his RFA was not successful.  I am interested in repeating medial branch blocks to see if RFA may be an option for him at this point to address some of the axial low back pain and stiffness.    We will schedule the patient for bilateral L4-5 and L5-S1 facet joint medial branch blocks with fluoroscopic guidance and local anesthesia.    Education was provided regarding the above impression/diagnosis and treatment options/plan were discussed.  All questions were answered during today's visit.  Patient will contact clinic if any other questions or concerns.          Parminder Cason,   Interventional Spine and Sports Medicine Specialist   Physical Medicine and Rehabilitation  [address] [address]    [address] [place] [address]

## 2024-12-17 NOTE — TELEPHONE ENCOUNTER
Facet/MBB CPT code: 1/4    Estimated body mass index is 29.99 kg/m² as calculated from the following:    Height as of an earlier encounter on 12/17/24: 71\".    Weight as of an earlier encounter on 12/17/24: 215 lb.  BMI<35- no restrictions    Patient has been scheduled for Bilateral L4-5 and L5-S1 Facet joint injections on 1/7/25 at the [facility] with Dr. Cason.   Anesthesia type:  Local  Please note: [facility] will call the business day prior to discuss the exact time/arrival and additional instructions for your appointment.  Patient was advised that if he/she does receive the covid vaccine it needs to be at least 2 weeks before or after the injection.  Medications and allergies reviewed.  Patient informed of [facility]'s  policy:  The patient will require transportation arrangements to and from the procedure, with the  present on site for the entire visit.  Without a , the appointment is subject to cancellation.    [facility] is located in the [facility] 1st floor [address] [place] [address].   may park in the yellow/purple parking lot.  Patient verbalized understanding and agrees with plan.  Scheduled in Epic: Yes  Scheduled in Surgical Case: Yes  Follow up appointment made: NOV: Visit date not found

## 2024-12-17 NOTE — TELEPHONE ENCOUNTER
Per CMS Guidelines -no authorization is required for Bilateral L4/5 and L5/S1 facet medial branch blocks, fluoroscopic guidance   CPT codes: 58328-36, 09242 x's 2       Status: Authorization is not required based on medical necessity however is not a guarantee of payment and may be subject to review once claim is submitted-Covered Benefit.  If this procedure is being performed at Outpatient Hospital/[facility]- authorization is required, please notify this writer.     This will be #3 in a rolling 12 month period

## 2025-01-08 ENCOUNTER — TELEPHONE (OUTPATIENT)
Dept: PHYSICAL MEDICINE AND REHAB | Facility: CLINIC | Age: 75
End: 2025-01-08

## 2025-01-08 ENCOUNTER — PATIENT MESSAGE (OUTPATIENT)
Dept: PHYSICAL MEDICINE AND REHAB | Facility: CLINIC | Age: 75
End: 2025-01-08

## 2025-01-08 DIAGNOSIS — M47.816 LUMBAR SPONDYLOSIS: Primary | ICD-10-CM

## 2025-01-08 NOTE — TELEPHONE ENCOUNTER
Pt called to give condition update after inj- please call to advise. Pt will also send message thru My Chart

## 2025-01-08 NOTE — TELEPHONE ENCOUNTER
Pt requesting to speak with a nurse for condition update. Pt states a MCM was sent with an attachment of hourly updates taken by pt. Please advise, thank you.

## 2025-01-09 RX ORDER — METHYLPREDNISOLONE 4 MG/1
TABLET ORAL
Qty: 1 EACH | Refills: 0 | Status: SHIPPED | OUTPATIENT
Start: 2025-01-09

## 2025-01-09 NOTE — TELEPHONE ENCOUNTER
Patient informed of medrol dosepak order and RFA order.     Patient will be out of town 1/12/25-1/19/24, but is still accepting phone calls. Patient informed he will receive a call to schedule once insurance approval is received.     Nothing further needed at this time.

## 2025-01-09 NOTE — TELEPHONE ENCOUNTER
Spoke with patient and reviewed attached update post injection. Patient reported from 3-5 pm he felt the best. 80-90 % relief for 1 hour 4-5 pm. When he was bent over for 3-4 minutes he did feel the pain start to increase again, but this subsided when he stood up straight again.    Patient is going on vacation this Sunday and is asking for the oral steroid to be sent to his pharmacy. States this was discussed with .     Medrol rx pended to  for review/approval, if appropriate.  RFA order also pended to .

## 2025-01-10 ENCOUNTER — TELEPHONE (OUTPATIENT)
Dept: PHYSICAL MEDICINE AND REHAB | Facility: CLINIC | Age: 75
End: 2025-01-10

## 2025-01-10 DIAGNOSIS — M47.816 LUMBAR SPONDYLOSIS: Primary | ICD-10-CM

## 2025-01-10 NOTE — TELEPHONE ENCOUNTER
Per CMS Guidelines -no authorization is required for bilateral L4-5 and L5-S1 medial branch radiofrequency ablation   CPT codes: 82777-75, 54702 x's 2       Status: Authorization is not required based on medical necessity however is not a guarantee of payment and may be subject to review once claim is submitted-Covered Benefit.  If this procedure is being performed at Outpatient Hospital[facility]- authorization is required, please notify this writer.     This will be #4 in a rolling 12 months

## 2025-01-10 NOTE — TELEPHONE ENCOUNTER
Patient has been scheduled for bilateral L4-5 and L5-S1 medial branch radiofrequency ablation on 1/21/25 at the [facility] with Dr. Cason.   Anesthesia type:  Local  Please note: The [facility] will call the business day prior to discuss the exact time/arrival and additional instructions for your appointment.  Patient was advised that if he/she does receive the covid vaccine it needs to be at least 2 weeks before or after the injection.  Medications and allergies reviewed.  Educated to hold NSAIDS (Aleve, Ibuprofen, Motrin, Advil) and anti-inflammatories (Meloxicam, Naproxen, Diclofenac, Celebrex) and for cervical injections must hold Multi-Vitamins, Vitamin E, Fish Oil/Omega-3.  If on blood thinner, clearance has been received and approved to hold this medication by provider.   Patient informed of [facility]'s  policy:  The patient will require transportation arrangements to and from the procedure, with the  present on site for the entire visit.  Without a , the appointment is subject to cancellation.    [facility] is located in the [facility] 1st floor [address], [place] [address].   may park in the yellow/purple parking lot.  Patient verbalized understanding and agrees with plan.  Scheduled in Epic: Yes  Scheduled in Surgical Case: Yes  Follow up appointment made: NOV: Visit date not found

## 2025-02-17 ENCOUNTER — TELEPHONE (OUTPATIENT)
Age: 75
End: 2025-02-17

## 2025-02-17 NOTE — TELEPHONE ENCOUNTER
Pt called requesting refills for doxazosin 8 MG Oral Tab ()  pt stated that he takes 1 1/2 tabs daily     Please inform pt when medication has been sent

## 2025-02-20 ENCOUNTER — HOSPITAL ENCOUNTER (OUTPATIENT)
Facility: HOSPITAL | Age: 75
Setting detail: HOSPITAL OUTPATIENT SURGERY
Discharge: HOME | End: 2025-02-20
Attending: INTERNAL MEDICINE | Admitting: INTERNAL MEDICINE
Payer: MEDICARE

## 2025-02-20 ENCOUNTER — ANESTHESIA EVENT (OUTPATIENT)
Dept: ENDOSCOPY | Facility: HOSPITAL | Age: 75
End: 2025-02-20
Payer: MEDICARE

## 2025-02-20 ENCOUNTER — ANESTHESIA (OUTPATIENT)
Dept: ENDOSCOPY | Facility: HOSPITAL | Age: 75
End: 2025-02-20
Payer: MEDICARE

## 2025-02-20 VITALS
BODY MASS INDEX: 30.8 KG/M2 | RESPIRATION RATE: 15 BRPM | OXYGEN SATURATION: 98 % | SYSTOLIC BLOOD PRESSURE: 122 MMHG | HEART RATE: 66 BPM | HEIGHT: 71 IN | DIASTOLIC BLOOD PRESSURE: 76 MMHG | WEIGHT: 220 LBS

## 2025-02-20 DIAGNOSIS — Z12.11 COLON CANCER SCREENING: ICD-10-CM

## 2025-02-20 PROBLEM — K64.8 INTERNAL HEMORRHOIDS: Status: ACTIVE | Noted: 2025-02-20

## 2025-02-20 PROBLEM — K63.5 POLYP OF COLON: Status: ACTIVE | Noted: 2025-02-20

## 2025-02-20 PROCEDURE — 0DBN8ZX EXCISION OF SIGMOID COLON, VIA NATURAL OR ARTIFICIAL OPENING ENDOSCOPIC, DIAGNOSTIC: ICD-10-PCS | Performed by: INTERNAL MEDICINE

## 2025-02-20 PROCEDURE — 45380 COLONOSCOPY AND BIOPSY: CPT | Performed by: INTERNAL MEDICINE

## 2025-02-20 RX ORDER — ALLOPURINOL 300 MG/1
300 TABLET ORAL DAILY
Qty: 90 TABLET | Refills: 0 | Status: SHIPPED | OUTPATIENT
Start: 2025-02-20

## 2025-02-20 RX ORDER — SODIUM CHLORIDE, SODIUM LACTATE, POTASSIUM CHLORIDE, CALCIUM CHLORIDE 600; 310; 30; 20 MG/100ML; MG/100ML; MG/100ML; MG/100ML
INJECTION, SOLUTION INTRAVENOUS CONTINUOUS
Status: DISCONTINUED | OUTPATIENT
Start: 2025-02-20 | End: 2025-02-20

## 2025-02-20 RX ORDER — NALOXONE HYDROCHLORIDE 0.4 MG/ML
0.08 INJECTION, SOLUTION INTRAMUSCULAR; INTRAVENOUS; SUBCUTANEOUS ONCE AS NEEDED
Status: DISCONTINUED | OUTPATIENT
Start: 2025-02-20 | End: 2025-02-20

## 2025-02-20 RX ORDER — ONDANSETRON 2 MG/ML
4 INJECTION INTRAMUSCULAR; INTRAVENOUS ONCE AS NEEDED
Status: DISCONTINUED | OUTPATIENT
Start: 2025-02-20 | End: 2025-02-20

## 2025-02-20 RX ORDER — LIDOCAINE HYDROCHLORIDE 10 MG/ML
INJECTION, SOLUTION EPIDURAL; INFILTRATION; INTRACAUDAL; PERINEURAL AS NEEDED
Status: DISCONTINUED | OUTPATIENT
Start: 2025-02-20 | End: 2025-02-20 | Stop reason: SURG

## 2025-02-20 RX ADMIN — SODIUM CHLORIDE, SODIUM LACTATE, POTASSIUM CHLORIDE, CALCIUM CHLORIDE: 600; 310; 30; 20 INJECTION, SOLUTION INTRAVENOUS at 07:58:00

## 2025-02-20 RX ADMIN — LIDOCAINE HYDROCHLORIDE 50 MG: 10 INJECTION, SOLUTION EPIDURAL; INFILTRATION; INTRACAUDAL; PERINEURAL at 07:35:00

## 2025-02-20 RX ADMIN — SODIUM CHLORIDE, SODIUM LACTATE, POTASSIUM CHLORIDE, CALCIUM CHLORIDE: 600; 310; 30; 20 INJECTION, SOLUTION INTRAVENOUS at 07:33:00

## 2025-02-20 NOTE — DISCHARGE INSTRUCTIONS
Home Care Instructions for Colonoscopy  Diet:  - Resume your regular diet as tolerated unless otherwise instructed.  - Start with light meals to minimize bloating.  - Do not drink alcohol today.    Medication:  - If you have questions about resuming your normal medications, please contact your Primary Care Physician.    Activities:  - Take it easy today. Do not return to work today.  - Do not drive today.  - Do not operate any machinery today (including kitchen equipment).    Colonoscopy:  - You may notice some rectal \"spotting\" (a little blood on the toilet tissue) for a day or two after the exam. This is normal.  - If you experience any rectal bleeding (not spotting), persistent tenderness or sharp severe abdominal pains, oral temperature over 100 degrees Fahrenheit, light-headedness or dizziness, or any other problems, contact your doctor.      **If unable to reach your doctor, please go to the [facility] Emergency Room**    - Your referring physician will receive a full report of your examination.  - If you do not hear from your doctor's office within two weeks of your biopsy, please call them for your results.    You may be able to see your laboratory results in [email or web address] between 4 and 7 business days.  In some cases, your physician may not have viewed the results before they are released to [email or web address].  If you have questions regarding your results contact the physician who ordered the test/exam by phone or via [email or web address] by choosing \"Ask a Medical Question.\"

## 2025-02-20 NOTE — ANESTHESIA POSTPROCEDURE EVALUATION
Patient: Jim Amin    Procedure Summary       Date: 02/20/25 Room / Location: [facility] ENDOSCOPY 03 / [facility] ENDOSCOPY    Anesthesia Start: 0733 Anesthesia Stop: 0801    Procedure: COLONOSCOPY with polypectomy Diagnosis:       Colon cancer screening      (diverticulosis, colon polyp)    Surgeons: Carla Callahan MD Anesthesiologist: Mihaela Desouza CRNA    Anesthesia Type: MAC ASA Status: 2            Anesthesia Type: MAC    Vitals Value Taken Time   /68 02/20/25 0801   Temp 98 02/20/25 0801   Pulse 77 02/20/25 0801   Resp 14 02/20/25 0801   SpO2 100 02/20/25 0801       [facility] AN Post Evaluation:   Patient Evaluated in PACU  Patient Participation: complete - patient participated  Level of Consciousness: awake  Pain Score: 0  Pain Management: adequate  Airway Patency:patent  Dental exam unchanged from preop  Yes    Nausea/Vomiting: none  Cardiovascular Status: acceptable  Respiratory Status: acceptable  Postoperative Hydration acceptable      Mihaela Desouza CRNA  2/20/2025 8:01 AM

## 2025-02-20 NOTE — H&P
History & Physical Examination    Patient Name: Jim Amin  MRN: [id number]  [facility]: [id number]  YOB: 1950    Diagnosis: screening for colon cancer    Prescriptions Prior to Admission[1]  Current Facility-Administered Medications   Medication Dose Route Frequency    lactated ringers infusion   Intravenous Continuous       Allergies: Allergies[2]    Past Medical History:    Asthma (HCC)    Gout    Hx of motion sickness     Past Surgical History:   Procedure Laterality Date    Inj paravert f jnt l/s 1 lev  6/18/2013    Procedure: LUMBAR FACET INJECTION OR MEDIAL BRANCH NERVE BLOCK;  Surgeon: Jairon Mejia MD;  Location: [facility]    Inj paravert f jnt l/s 1 lev  7/3/2013    Procedure: LUMBAR FACET INJECTION OR MEDIAL BRANCH NERVE BLOCK;  Surgeon: Jairon Mejia MD;  Location: [facility]    Other surgical history       Family History   Problem Relation Age of Onset    Diabetes Father     Diabetes Mother      Social History     Tobacco Use    Smoking status: Never    Smokeless tobacco: Never   Substance Use Topics    Alcohol use: Yes     Alcohol/week: 40.0 standard drinks of alcohol     Types: 20 Glasses of wine, 20 Shots of liquor per week     Comment: social       SYSTEM Check if Review is Normal Check if Physical Exam is Normal If not normal, please explain:   HEENT [X ] [ X]    NECK  [X ] [ X]    HEART [X ] [ X]    LUNGS [X ] [ X]    ABDOMEN [X ] [ X]    EXTREMITIES [X ] [ X]    OTHER        I have discussed the risks and benefits and alternatives of the procedure with the patient/family.  They understand and agree to proceed with plan of care.   I have reviewed the History and Physical done within the last 30 days.  Any changes noted above.    Carla Callahan MD  [facility]                   [1]   Medications Prior to Admission   Medication Sig Dispense Refill Last Dose/Taking    traMADol 50 MG Oral Tab Take 1 tablet (50 mg  total) by mouth every 8 (eight) hours as needed for Pain. 36 tablet 0 Taking As Needed    ALBUTEROL 108 (90 Base) MCG/ACT Inhalation Aero Soln INHALE 2 PUFFS INTO THE LUNGS EVERY 4 HOURS AS NEEDED 8.5 g 0 Taking As Needed    allopurinol 300 MG Oral Tab Take 1 tablet (300 mg total) by mouth daily. 90 tablet 0 2/19/2025    MAGNESIUM OR Take 1 tablet by mouth daily.   2/17/2025    Multiple Vitamin (MULTI-VITAMIN) Oral Tab Take 1 tablet by mouth daily.   2/17/2025    sennosides-docusate 8.6-50 MG Oral Tab Take 2 tablets by mouth 2 (two) times daily.   Taking    vitamin E 1000 UNITS Oral Cap Take 1 capsule (1,000 Units total) by mouth daily.   2/17/2025    omega-3 fatty acids 1000 MG Oral Cap Take 1,000 mg by mouth daily.   2/17/2025    doxazosin 8 MG Oral Tab Take 1 tablet (8 mg total) by mouth nightly.   2/19/2025    diazePAM 5 MG Oral Tab Take 1 tablet (5 mg total) by mouth daily. 30 tablet 2 Taking    diclofenac 50 MG Oral Tab EC Take 1 tablet (50 mg total) by mouth 2 (two) times daily with meals.   Taking    sildenafil 20 MG Oral Tab    Taking    azelastine 0.1 % Nasal Solution 1 spray by Nasal route 2 (two) times daily. 30 mL 2 Taking    aspirin 81 MG Oral Chew Tab Chew 1 tablet (81 mg total) by mouth daily.   2/16/2025    methylPREDNISolone (MEDROL) 4 MG Oral Tablet Therapy Pack Take as directed on package. 1 each 0     acetaminophen 500 MG Oral Tab Take 2 tablets (1,000 mg total) by mouth every 8 (eight) hours.       Na Sulfate-K Sulfate-Mg Sulf (SUPREP BOWEL PREP KIT) 17.5-3.13-1.6 GM/177ML Oral Solution Take as discussed in clinic 1 each 0    [2] No Known Allergies

## 2025-02-20 NOTE — OPERATIVE REPORT
COLONOSCOPY REPORT    Jim Amin     1950 Age 74 year old   PCP Ab Campos MD Endoscopist Carla Callahan MD       Date of procedure: 25    Procedure: Colonoscopy w/ cold forceps polypectomy     Pre-operative diagnosis: CRC screening     Post-operative diagnosis: colon polyp, diverticulosis     Medications: MAC    Withdrawal time: 14 minutes    Procedure:  Informed consent was obtained from the patient after the risks of the procedure were discussed, including but not limited to bleeding, perforation, aspiration, infection, or possibility of a missed lesion. After discussions of the risks/benefits and alternatives to this procedure, as well as the planned sedation, the patient was placed in the left lateral decubitus position and begun on continuous blood pressure pulse oximetry and EKG monitoring and this was maintained throughout the procedure. Once an adequate level of sedation was obtained a digital rectal exam was completed. Then the lubricated tip of the [id number] diagnostic video colonoscope was inserted and advanced without difficulty to the cecum using water immersion and CO2 insufflation technique. The cecum was identified by localizing the trifold, the appendix and the ileocecal valve. Withdrawal was begun with thorough washing and careful examination of the colonic walls and folds. A routine second examination of the cecum/ascending colon was performed. Photodocumentation was obtained. The bowel prep was good. Views of the colon were good with washing. I then carefully withdrew the instrument from the patient who tolerated the procedure well.     Complications: none.    Findings:   1. One polyp noted as follows:      A. 3 mm polyp in the sigmoid colon; sessile morphology; cold forceps polypectomy performed, polyp retrieved.    2. Diverticulosis: left-sided.    3. Ileocecal valve appeared healthy and normal. The examined portion of the terminal ileum appeared normal.      4. The colonic mucosa throughout the colon showed normal vascular pattern, without evidence of angioectasias or inflammation.     5. A retroflexed view of the rectum appeared normal.    6. GILLIAN: normal rectal tone, no masses palpated.     Impression:   One small (< 5 mm) polyp removed.   Left-sided diverticulosis.   The colon was otherwise normal with glistening mucosa and intact vascular pattern.    Recommend:  Await pathology. The interval for the next colonoscopy will be determined after reviewing pathology. If new signs or symptoms develop, colonoscopy may need to be repeated sooner.   High fiber diet.  Monitor for blood in the stool. If having more than just tinge of blood, call office or go to the ER.  Avoid NSAIDs (motrin, ibuprofen, aleve, advil, naproxen, midol, naprosyn, excedrin) for 14 days.     >>>If tissue was obtained and you have not received your pathology results either by phone or letter within 2 weeks, please call our office at [phone].    Specimens: colon polyp     Blood loss: <1 ml

## 2025-02-20 NOTE — ANESTHESIA PREPROCEDURE EVALUATION
Anesthesia PreOp Note    HPI:     Jim Amin is a 74 year old male who presents for preoperative consultation requested by: Carla Callahan MD    Date of Surgery: 2/20/2025    Procedure(s):  COLONOSCOPY  Indication: Colon cancer screening    Relevant Problems   No relevant active problems       NPO:  Last Liquid Consumption Date: 02/19/25  Last Liquid Consumption Time: 2230  Last Solid Consumption Date: 02/19/25  Last Solid Consumption Time: 0700  Last Liquid Consumption Date: 02/19/25          History Review:  Patient Active Problem List    Diagnosis Date Noted    Chronic right shoulder pain 05/30/2024    History of hematuria 05/30/2024    Anxiety 02/02/2023    Personal history of atrial flutter 11/23/2021    History of cardiac radiofrequency ablation 11/23/2021    Mild intermittent asthma without complication (HCC) 11/23/2021    Aortic insufficiency 05/03/2019    Thoracic aortic aneurysm 01/28/2019    BPH (benign prostatic hyperplasia) 04/18/2018    Carotid artery stenosis 04/18/2018    Gout 04/18/2018    Lumbar spondylosis 06/18/2013    DDD (degenerative disc disease), lumbar 06/18/2013       Past Medical History:    Asthma (HCC)    Gout    Hx of motion sickness       Past Surgical History:   Procedure Laterality Date    Inj paravert f jnt l/s 1 lev  6/18/2013    Procedure: LUMBAR FACET INJECTION OR MEDIAL BRANCH NERVE BLOCK;  Surgeon: Jairon Mejia MD;  Location: [facility]    Inj paravert f jnt l/s 1 lev  7/3/2013    Procedure: LUMBAR FACET INJECTION OR MEDIAL BRANCH NERVE BLOCK;  Surgeon: Jairon Mejia MD;  Location: [facility]    Other surgical history         Prescriptions Prior to Admission[1]  Current Medications and Prescriptions Ordered in Epic[2]    Allergies[3]    Family History   Problem Relation Age of Onset    Diabetes Father     Diabetes Mother      Social History     Socioeconomic History    Marital status:    Tobacco Use     Smoking status: Never    Smokeless tobacco: Never   Vaping Use    Vaping status: Never Used   Substance and Sexual Activity    Alcohol use: Yes     Alcohol/week: 40.0 standard drinks of alcohol     Types: 20 Glasses of wine, 20 Shots of liquor per week     Comment: social    Drug use: Never       Available pre-op labs reviewed.             Vital Signs:  Body mass index is 30.68 kg/m².   height is 1.803 m (5' 11\") and weight is 99.8 kg (220 lb). His blood pressure is 104/75 and his pulse is 76. His respiration is 13 and oxygen saturation is 98%.   Vitals:    02/13/25 1442 02/20/25 0651   BP:  104/75   Pulse:  76   Resp:  13   SpO2:  98%   Weight: 99.8 kg (220 lb)    Height: 1.803 m (5' 11\")         Anesthesia Evaluation     Patient summary reviewed and Nursing notes reviewed    Airway   Mallampati: II  TM distance: >3 FB  Neck ROM: full  Dental - Dentition appears grossly intact     Pulmonary - normal exam   (+) asthma  Cardiovascular - normal exam    ROS comment: Echo showed mild aortic stenosis and mild mitral regurg EF 55-60%    Neuro/Psych    (+)  anxiety/panic attacks,        GI/Hepatic/Renal    (+) bowel prep    Endo/Other - negative ROS   Abdominal  - normal exam                 Anesthesia Plan:   ASA:  2  Plan:   MAC  Informed Consent Plan and Risks Discussed With:  Patient  Discussed plan with:  CRNA      I have informed Jim Amin and/or legal guardian or family member of the nature of the anesthetic plan, benefits, risks including possible dental damage if relevant, major complications, and any alternative forms of anesthetic management.   All of the patient's questions were answered to the best of my ability. The patient desires the anesthetic management as planned.  Mihaela Desouza CRNA  2/20/2025 7:23 AM  Present on Admission:  **None**           [1]   Medications Prior to Admission   Medication Sig Dispense Refill Last Dose/Taking    traMADol 50 MG Oral Tab Take 1 tablet (50 mg total) by mouth  every 8 (eight) hours as needed for Pain. 36 tablet 0 Taking As Needed    ALBUTEROL 108 (90 Base) MCG/ACT Inhalation Aero Soln INHALE 2 PUFFS INTO THE LUNGS EVERY 4 HOURS AS NEEDED 8.5 g 0 Taking As Needed    allopurinol 300 MG Oral Tab Take 1 tablet (300 mg total) by mouth daily. 90 tablet 0 2/19/2025    MAGNESIUM OR Take 1 tablet by mouth daily.   2/17/2025    Multiple Vitamin (MULTI-VITAMIN) Oral Tab Take 1 tablet by mouth daily.   2/17/2025    sennosides-docusate 8.6-50 MG Oral Tab Take 2 tablets by mouth 2 (two) times daily.   Taking    vitamin E 1000 UNITS Oral Cap Take 1 capsule (1,000 Units total) by mouth daily.   2/17/2025    omega-3 fatty acids 1000 MG Oral Cap Take 1,000 mg by mouth daily.   2/17/2025    doxazosin 8 MG Oral Tab Take 1 tablet (8 mg total) by mouth nightly.   2/19/2025    diazePAM 5 MG Oral Tab Take 1 tablet (5 mg total) by mouth daily. 30 tablet 2 Taking    diclofenac 50 MG Oral Tab EC Take 1 tablet (50 mg total) by mouth 2 (two) times daily with meals.   Taking    sildenafil 20 MG Oral Tab    Taking    azelastine 0.1 % Nasal Solution 1 spray by Nasal route 2 (two) times daily. 30 mL 2 Taking    aspirin 81 MG Oral Chew Tab Chew 1 tablet (81 mg total) by mouth daily.   2/16/2025    methylPREDNISolone (MEDROL) 4 MG Oral Tablet Therapy Pack Take as directed on package. 1 each 0     acetaminophen 500 MG Oral Tab Take 2 tablets (1,000 mg total) by mouth every 8 (eight) hours.       Na Sulfate-K Sulfate-Mg Sulf (SUPREP BOWEL PREP KIT) 17.5-3.13-1.6 GM/177ML Oral Solution Take as discussed in clinic 1 each 0    [2]   Current Facility-Administered Medications Ordered in Epic   Medication Dose Route Frequency Provider Last Rate Last Admin    lactated ringers infusion   Intravenous Continuous Carla Callahan MD         No current [facility]-ordered outpatient medications on file.   [3] No Known Allergies

## 2025-02-21 ENCOUNTER — TELEPHONE (OUTPATIENT)
Age: 75
End: 2025-02-21

## 2025-02-21 RX ORDER — DOXAZOSIN 8 MG/1
8 TABLET ORAL NIGHTLY
Qty: 90 TABLET | Refills: 2 | Status: SHIPPED | OUTPATIENT
Start: 2025-02-21

## 2025-02-21 NOTE — TELEPHONE ENCOUNTER
Patient calling for DOXAZOSIN refill, okay per dr jhaveri to send over to pharmacy with 3 refills.

## 2025-02-24 NOTE — TELEPHONE ENCOUNTER
Appears refill for Doxazosin Rx refill was sent last week.  No notation as to whether pt was informed.    Lingoing message sent to pt to inform today.

## 2025-02-25 ENCOUNTER — OFFICE VISIT (OUTPATIENT)
Dept: PHYSICAL MEDICINE AND REHAB | Facility: CLINIC | Age: 75
End: 2025-02-25
Payer: MEDICARE

## 2025-02-25 VITALS — HEIGHT: 71 IN | BODY MASS INDEX: 30.1 KG/M2 | WEIGHT: 215 LBS

## 2025-02-25 DIAGNOSIS — M47.816 LUMBAR SPONDYLOSIS: Primary | ICD-10-CM

## 2025-02-25 DIAGNOSIS — G89.29 CHRONIC RIGHT-SIDED LOW BACK PAIN WITHOUT SCIATICA: ICD-10-CM

## 2025-02-25 DIAGNOSIS — M79.18 BUTTOCK PAIN: ICD-10-CM

## 2025-02-25 DIAGNOSIS — M54.50 CHRONIC RIGHT-SIDED LOW BACK PAIN WITHOUT SCIATICA: ICD-10-CM

## 2025-02-25 PROCEDURE — 99214 OFFICE O/P EST MOD 30 MIN: CPT | Performed by: PHYSICAL MEDICINE & REHABILITATION

## 2025-02-25 RX ORDER — CYCLOBENZAPRINE HCL 10 MG
10 TABLET ORAL NIGHTLY
Qty: 30 TABLET | Refills: 0 | Status: SHIPPED | OUTPATIENT
Start: 2025-02-25

## 2025-02-25 NOTE — PROGRESS NOTES
RETURN PATIENT VISIT    CHIEF COMPLAINT  Low back pain     INTERVAL HISTORY  Jim Amin is a 74 year old who was last seen in clinic on   History of Present Illness  The pain is described as a constant, crushing tension in the lower back, which is particularly severe upon waking and eases somewhat after moving around and exercising. The patient reports that the pain is so intense that it sometimes prevents him from using the bathroom in the morning. Despite the pain, the patient is able to exercise regularly, including golfing and using an elliptical machine, although certain exercises exacerbate the pain.    In addition to the back pain, the patient has been experiencing diffuse pain from the shoulders to the knees, which he describes as a constant ache. The patient also reports a recent episode of black stools, which occurred after he took excessive amounts of ibuprofen to manage his pain during a golfing vacation. The patient stopped taking the ibuprofen and the black stools resolved after two days.    The patient expresses concern about the worsening of his symptoms and the potential impact on his quality of life as he ages. He is resistant to the idea of surgery and is seeking other options for pain management.      REVIEW OF SYSTEMS  Review of systems was completed with the patient today as pertinent to today's visit    PHYSICAL EXAMINATION  CONSTITUTIONAL: Well-appearing, in no apparent distress  EYES: No scleral icterus or conjunctival hemorrhage  CARDIOVASCULAR: Skin warm and well-perfused, no peripheral edema  RESPIRATORY: Breathing unlabored without accessory muscle use  PSYCHIATRIC: Alert, cooperative, appropriate mood and affect  SKIN: No lesions or rashes on exposed skin  MUSCULOSKELETAL:   Physical Exam    Limited range of motion in flexion and extension. Slight difficulty getting up from a seated position with pain in the axial low back. No neural tension signs are positive today.     REVIEW  OF PRIOR X-RAYS/STUDIES  MRI of the lumbar spine dated 10/10/2024 which reveals degenerative changes most notably from L2-S1, at L3-4 and L4-5 there is broad-based disc bulging resulting in mild foraminal narrowing bilaterally. Worse on the right than on the left at the L3-4 level, there is also right-sided foraminal narrowing and lateral recess narrowing at L5-S1 worsened since prior examination.     IMPRESSION/DIAGNOSIS  1.   Encounter Diagnoses   Name Primary?    Lumbar spondylosis Yes    Buttock pain     Chronic right-sided low back pain without sciatica      TREATMENT/PLAN  Assessment & Plan  Lower Back Pain  Severe, constant pain, particularly in the morning. Pain is relieved with movement and exercise, but exacerbated by prolonged sitting. Previous interventions have provided partial relief.  -Start muscle relaxer at bedtime to alleviate muscle tension and pain.  -Continue current exercise regimen as tolerated.    Gastrointestinal Bleeding  Reported black stools after excessive NSAID use for pain management. Stools normalized after discontinuing NSAIDs. Recent colonoscopy was unremarkable.  -Discuss this issue with Dr. Campos in upcoming appointment.  -Avoid NSAIDs due to risk of gastrointestinal bleeding.    Diffuse Musculoskeletal Pain  Reports diffuse pain from shoulders to knees. Negative rheumatoid factor two years ago.  -Consult with rheumatology for further evaluation of diffuse pain.    Follow-up  -Appointment with Dr. Cao to discuss persistent lower back pain.  -Appointment with Dr. Campos to discuss gastrointestinal bleeding and diffuse musculoskeletal pain.  -Check-in after starting muscle relaxer to assess efficacy and side effects.    Education was provided regarding the above impression/diagnosis and treatment options/plan were discussed.  All questions were answered during today's visit.  Patient will contact clinic if any other questions or concerns.          Parminder Cason,    Interventional Spine and Sports Medicine Specialist   Physical Medicine and Rehabilitation  [facility]  [address] [place] [id number]    [facility]  [address] [place] [address]

## 2025-02-26 ENCOUNTER — OFFICE VISIT (OUTPATIENT)
Age: 75
End: 2025-02-26
Payer: MEDICARE

## 2025-02-26 VITALS
BODY MASS INDEX: 30.8 KG/M2 | RESPIRATION RATE: 18 BRPM | OXYGEN SATURATION: 98 % | WEIGHT: 220 LBS | DIASTOLIC BLOOD PRESSURE: 64 MMHG | HEIGHT: 71 IN | SYSTOLIC BLOOD PRESSURE: 108 MMHG | HEART RATE: 117 BPM

## 2025-02-26 DIAGNOSIS — K92.1 MELENA: ICD-10-CM

## 2025-02-26 DIAGNOSIS — G89.29 CHRONIC BILATERAL THORACIC BACK PAIN: Primary | ICD-10-CM

## 2025-02-26 DIAGNOSIS — M79.10 MYALGIA: ICD-10-CM

## 2025-02-26 DIAGNOSIS — I71.21 ANEURYSM OF ASCENDING AORTA WITHOUT RUPTURE: ICD-10-CM

## 2025-02-26 DIAGNOSIS — M54.6 CHRONIC BILATERAL THORACIC BACK PAIN: Primary | ICD-10-CM

## 2025-02-26 PROCEDURE — 99214 OFFICE O/P EST MOD 30 MIN: CPT | Performed by: INTERNAL MEDICINE

## 2025-02-26 PROCEDURE — G2211 COMPLEX E/M VISIT ADD ON: HCPCS | Performed by: INTERNAL MEDICINE

## 2025-02-26 RX ORDER — ESOMEPRAZOLE MAGNESIUM 40 MG/1
40 CAPSULE, DELAYED RELEASE ORAL DAILY
Qty: 90 CAPSULE | Refills: 1 | Status: SHIPPED | OUTPATIENT
Start: 2025-02-26 | End: 2026-02-21

## 2025-02-26 NOTE — PROGRESS NOTES
Jim Amin male 74 year old       Chief complaint:  aortic  aneurysm, diffuse aches and pain, gout, recent leg abrasion        Left  shin  abrasion  now  red no fever or chill has large scab.      We discussed his aortic aneurysm last spring CT showed it slightly enlarged he saw Dr. Allen who told him he does not deal with thoracic aneurysms.  Discussed we will recheck his CT this spring decide on further evaluation.  CTA was ordered he currently has no symptoms      Constant  pain  seems axial-  and from shoulders  to knees  -  stiffness every am   hunched over has been seeing neurosurgery and physiatry    Had  injections  -  had  ablation  Was recommended he would be a good surgical candidate    Had recent  melena after  ibuprofen and  diclofenec it subsided after he stopped it.    Recent  colonoscopy  -      Tramadol prescribed for his pain made  him goofy he takes occasional Flexeril.                 Has no  cardiac symptoms   Denies any respiratory symptoms     has no neurological symptoms              Patient Active Problem List   Diagnosis    Lumbar spondylosis    DDD (degenerative disc disease), lumbar    Aortic insufficiency    BPH (benign prostatic hyperplasia)    Carotid artery stenosis    Gout    Thoracic aortic aneurysm    Personal history of atrial flutter    History of cardiac radiofrequency ablation    Mild intermittent asthma without complication (HCC)    Anxiety    Chronic right shoulder pain    History of hematuria    Colon cancer screening    Polyp of colon    Internal hemorrhoids        Current Outpatient Medications   Medication Sig Dispense Refill    cephALEXin 500 MG Oral Cap Take 1 capsule (500 mg total) by mouth 2 (two) times daily. 14 capsule 0    Esomeprazole Magnesium (NEXIUM) 40 MG Oral Capsule Delayed Release Take 1 capsule (40 mg total) by mouth daily. 90 capsule 1    cyclobenzaprine 10 MG Oral Tab Take 1 tablet (10 mg total) by mouth nightly. 30 tablet 0    doxazosin 8 MG  Oral Tab Take 1 tablet (8 mg total) by mouth nightly. 90 tablet 2    ALLOPURINOL 300 MG Oral Tab TAKE 1 TABLET(300 MG) BY MOUTH DAILY 90 tablet 0    ALBUTEROL 108 (90 Base) MCG/ACT Inhalation Aero Soln INHALE 2 PUFFS INTO THE LUNGS EVERY 4 HOURS AS NEEDED 8.5 g 0    acetaminophen 500 MG Oral Tab Take 2 tablets (1,000 mg total) by mouth every 8 (eight) hours.      MAGNESIUM OR Take 1 tablet by mouth daily.      Multiple Vitamin (MULTI-VITAMIN) Oral Tab Take 1 tablet by mouth daily.      sennosides-docusate 8.6-50 MG Oral Tab Take 2 tablets by mouth 2 (two) times daily.      vitamin E 1000 UNITS Oral Cap Take 1 capsule (1,000 Units total) by mouth daily.      omega-3 fatty acids 1000 MG Oral Cap Take 1,000 mg by mouth daily.      diazePAM 5 MG Oral Tab Take 1 tablet (5 mg total) by mouth daily. 30 tablet 2    sildenafil 20 MG Oral Tab       azelastine 0.1 % Nasal Solution 1 spray by Nasal route 2 (two) times daily. 30 mL 2    aspirin 81 MG Oral Chew Tab Chew 1 tablet (81 mg total) by mouth daily.          Vitals:    02/26/25 1013   BP: 108/64   Pulse: 117   Resp: 18   VITALSBody mass index is 30.68 kg/m².     General: Healthy looking   Normal affect ,  normal cognition  neuro nl   Neck exam normal   Heart  regular   lungs clear   abd normal   ext  no edema , no joint swelling        Jim was seen today for whole body pain.    Diagnoses and all orders for this visit:    Chronic bilateral thoracic back pain  -     Connective Tissue Disease (YOHAN) Screen, Reflex Specific Antibody ([place]); Future  -     Rheumatoid Arthritis Factor; Future  -     Uric Acid, Serum; Future  -     Cyclic Citrullinate Peptide (CCP) antibodies; Future  -     Sed Rate, Westergren (Automated); Future  -     C-Reactive Protein; Future  -     CK (Creatine Kinase) (Not Creatinine) [E]; Future  -     RHEUMATOLOGY - INTERNAL    Myalgia  -     CK (Creatine Kinase) (Not Creatinine) [E]; Future  -     RHEUMATOLOGY -  INTERNAL    Melena    Aneurysm of ascending aorta without rupture  -     CTA CHEST (CPT=71275); Future    Other orders  -     Esomeprazole Magnesium (NEXIUM) 40 MG Oral Capsule Delayed Release; Take 1 capsule (40 mg total) by mouth daily.  -     cephALEXin 500 MG Oral Cap; Take 1 capsule (500 mg total) by mouth 2 (two) times daily.       Main discussion was his pain.  Will do workup looking for underlying inflammation.  He does have gout but his symptoms are consistent with that also does not have any nonbearing joint swelling which would be more consistent with rheumatoid arthritis or lupus.    Discussed he could have fibromyalgia.  Also in the differential is polymyalgia rheumatica we will check all these.    For his aneurysm we will repeat CTA in the near future    He has recently had melena discussed upper GI endoscopy would be standard of care however he is like to hold off on that seems like the causes obvious discussed no nonsteroidals and started on PPI for minimum of 3 months after that we can consider restarting it with taking PPI on a regular basis    Continue allopurinol for gout.                                      This note was prepared using Dragon Medical voice recognition dictation software and as a result, errors may occur. When identified, these errors have been corrected. While every attempt is made to correct errors during dictation, discrepancies may still exist

## 2025-02-27 ENCOUNTER — HOSPITAL ENCOUNTER (OUTPATIENT)
Dept: GENERAL RADIOLOGY | Facility: HOSPITAL | Age: 75
Discharge: HOME | End: 2025-02-27
Attending: STUDENT IN AN ORGANIZED HEALTH CARE EDUCATION/TRAINING PROGRAM
Payer: MEDICARE

## 2025-02-27 ENCOUNTER — OFFICE VISIT (OUTPATIENT)
Dept: SURGERY | Facility: CLINIC | Age: 75
End: 2025-02-27
Payer: MEDICARE

## 2025-02-27 ENCOUNTER — LAB ENCOUNTER (OUTPATIENT)
Dept: LAB | Facility: HOSPITAL | Age: 75
End: 2025-02-27
Attending: INTERNAL MEDICINE
Payer: MEDICARE

## 2025-02-27 VITALS
OXYGEN SATURATION: 98 % | HEIGHT: 71 IN | BODY MASS INDEX: 30.1 KG/M2 | SYSTOLIC BLOOD PRESSURE: 131 MMHG | WEIGHT: 215 LBS | DIASTOLIC BLOOD PRESSURE: 75 MMHG | HEART RATE: 71 BPM

## 2025-02-27 DIAGNOSIS — M48.061 STENOSIS OF LATERAL RECESS OF LUMBAR SPINE: ICD-10-CM

## 2025-02-27 DIAGNOSIS — M48.061 LUMBAR FORAMINAL STENOSIS: ICD-10-CM

## 2025-02-27 DIAGNOSIS — M47.816 LUMBAR SPONDYLOSIS: ICD-10-CM

## 2025-02-27 DIAGNOSIS — M53.2X6 SPINAL INSTABILITY OF LUMBAR REGION: ICD-10-CM

## 2025-02-27 DIAGNOSIS — M43.16 SPONDYLOLISTHESIS OF LUMBAR REGION: ICD-10-CM

## 2025-02-27 DIAGNOSIS — M54.16 LUMBAR RADICULOPATHY: ICD-10-CM

## 2025-02-27 DIAGNOSIS — M53.2X6 SPINAL INSTABILITY OF LUMBAR REGION: Primary | ICD-10-CM

## 2025-02-27 DIAGNOSIS — G89.29 CHRONIC BILATERAL THORACIC BACK PAIN: ICD-10-CM

## 2025-02-27 DIAGNOSIS — M79.10 MYALGIA: ICD-10-CM

## 2025-02-27 DIAGNOSIS — M54.6 CHRONIC BILATERAL THORACIC BACK PAIN: ICD-10-CM

## 2025-02-27 LAB
CK SERPL-CCNC: 109 U/L
CRP SERPL-MCNC: <0.4 MG/DL (ref ?–1)
ERYTHROCYTE [SEDIMENTATION RATE] IN BLOOD: 10 MM/HR
RHEUMATOID FACT SERPL-ACNC: <3.5 IU/ML (ref ?–14)
URATE SERPL-MCNC: 5.7 MG/DL

## 2025-02-27 PROCEDURE — 77073 BONE LENGTH STUDIES: CPT | Performed by: STUDENT IN AN ORGANIZED HEALTH CARE EDUCATION/TRAINING PROGRAM

## 2025-02-27 PROCEDURE — 86038 ANTINUCLEAR ANTIBODIES: CPT

## 2025-02-27 PROCEDURE — 72082 X-RAY EXAM ENTIRE SPI 2/3 VW: CPT | Performed by: STUDENT IN AN ORGANIZED HEALTH CARE EDUCATION/TRAINING PROGRAM

## 2025-02-27 PROCEDURE — 82550 ASSAY OF CK (CPK): CPT

## 2025-02-27 PROCEDURE — 84550 ASSAY OF BLOOD/URIC ACID: CPT

## 2025-02-27 PROCEDURE — 86431 RHEUMATOID FACTOR QUANT: CPT

## 2025-02-27 PROCEDURE — 99215 OFFICE O/P EST HI 40 MIN: CPT | Performed by: STUDENT IN AN ORGANIZED HEALTH CARE EDUCATION/TRAINING PROGRAM

## 2025-02-27 PROCEDURE — 86140 C-REACTIVE PROTEIN: CPT

## 2025-02-27 PROCEDURE — 36415 COLL VENOUS BLD VENIPUNCTURE: CPT

## 2025-02-27 PROCEDURE — 85652 RBC SED RATE AUTOMATED: CPT

## 2025-02-27 PROCEDURE — 86200 CCP ANTIBODY: CPT

## 2025-02-27 PROCEDURE — 86225 DNA ANTIBODY NATIVE: CPT

## 2025-02-27 RX ORDER — CEPHALEXIN 500 MG/1
500 CAPSULE ORAL 2 TIMES DAILY
Qty: 14 CAPSULE | Refills: 0 | Status: SHIPPED | OUTPATIENT
Start: 2025-02-27

## 2025-02-27 NOTE — PROGRESS NOTES
[facility]  Neurological Surgery Established Patient Clinic Note    Jim Amin  8/9/1950  [id number]  PCP: Ab Campos MD  Referring Provider: Parminder Cason DO    REASON FOR VISIT:  Lumbar radiculopathy    HISTORY OF PRESENT ILLNESS 11/11/2024:  Jim Amin is a(n) 74 year old male who presents with a 10-year history of chronic lower back pain, initially triggered by an injury while moving furniture in [id number]. The pain is localized to the lower right back and radiates to the groin, buttocks, and lateral thigh. He describes the sensation as akin to having a \"rock in a bathtub\" and reports significant morning stiffness with difficulty rising from bed. The pain improves after sitting for about 20 minutes but is exacerbated by prolonged standing, sitting, and twisting movements.    Over the years, Jim has tried various treatments, including nerve ablation (approximately 8-9 years ago), acupuncture with electrostimulation, chiropractic manipulation, and physical therapy focused on myofascial release and balance. Acupuncture provided temporary relief lasting about a month per session. He enjoys golfing but has difficulty swinging due to pain and stiffness, requiring medications to play and often needing to stop after 12-13 holes due to inability to fully rotate his trunk.    He reports that stretching forward helps alleviate his pain, while backward extension exacerbates it. He denies any radiating pain down the legs, numbness, tingling, or weakness in the lower extremities. He also notes aching in the groin area upon waking, which improves with movement.    He had an MRI 10 years ago showing degenerative changes, and a recent MRI indicates worsening of these conditions. He is open to surgical intervention if it offers relief. He received an L5-S1 interlaminar epidural steroid injection on 10/28/2024, providing temporary 70-75% pain relief that lasted three days.  Current management includes as-needed diclofenac and Tylenol.    INTERVAL HISTORY 12/9/2024:  Since the patient’s last visit approximately one month ago, he reports a period of significantly reduced pain following the right L3 transforaminal epidural steroid injection. He experienced about two weeks of improved pain tolerance and reduced reliance on oral analgesics. Although low back soreness intermittently returns, it is notably less intense and more transient than before. He continues regular exercise, including strengthening and flexibility routines, and has successfully resumed most daily activities without requiring continuous pain medication. No new neurological deficits are reported, and he remains stable from a systemic standpoint.    INTERVAL HISTORY 2/27/2025:  Jim Amin returns for a follow-up approximately two and a half months after his last injection visit to discuss ongoing low back pain. He reports that the pain continues to be most severe upon rising from bed, often requiring him to walk hunched over initially until the muscles loosen. He describes persistent deep aching from his shoulders down to his knees, with notable tightness in the lower lumbar region. He has maintained a four-day-per-week exercise routine, including elliptical use and light resistance work, though he experiences post-exercise soreness that sometimes escalates later in the day. Notably, a prior medial branch ablation offered temporary, partial relief. Recent laboratory testing was ordered to rule out rheumatoid arthritis, though his clinical presentation remains more suggestive of a mechanical etiology rather than an inflammatory process. He denies any new or progressive neurological deficits.    PAST MEDICAL HISTORY:  Past Medical History:    Asthma (HCC)    Gout    Hx of motion sickness     Gout (on allopurinol)  Urinary issues (on doxazosin)  No history of hypertension, diabetes, or other chronic conditions  reported    PAST SURGICAL HISTORY:  Past Surgical History:   Procedure Laterality Date    Colonoscopy N/A 2/20/2025    Procedure: COLONOSCOPY with polypectomy;  Surgeon: Carla Callahan MD;  Location: [facility] ENDOSCOPY    Inj paravert f jnt l/s 1 lev  6/18/2013    Procedure: LUMBAR FACET INJECTION OR MEDIAL BRANCH NERVE BLOCK;  Surgeon: Jairon Mejia MD;  Location: [facility]    Inj paravert f jnt l/s 1 [facility]  7/3/2013    Procedure: LUMBAR FACET INJECTION OR MEDIAL BRANCH NERVE BLOCK;  Surgeon: Jairon Mejia MD;  Location: [facility]    Other surgical history     Status post shoulder replacement surgery    FAMILY HISTORY:  family history includes Diabetes in his father and mother.    SOCIAL HISTORY:   reports that he has never smoked. He has never used smokeless tobacco. He reports current alcohol use of about 40.0 standard drinks of alcohol per week. He reports that he does not use drugs.  Works in the [employer] industry; on his feet frequently  Exercises four days a week; enjoys walking and golfing  ; lives with his wife  Denies tobacco or illicit drug use    ALLERGIES:  Allergies[1]    MEDICATIONS:  Medications Ordered Prior to Encounter[2]    REVIEW OF SYSTEMS:  All other systems were reviewed and were negative except for those previously mentioned in the HPI    PHYSICAL EXAMINATION:  General: No acute distress.  Respiratory: Non-labored respirations bilaterally. No audible wheezing  Cardiovascular: Extremities warm and well-perfused.  Abdomen: Soft, nontender, nondistended.   Musculoskeletal: Moves all extremities well, symmetrically.  Extremities: No edema.  Back Examination:  Inspection: Normal spinal alignment; no visible deformities.  Palpation: Tenderness over the right lower lumbar region and right sacroiliac (SI) joint area.  Range of Motion: Limited trunk rotation and extension due to pain; forward flexion provides some relief.    NEUROLOGIC  EXAMINATION:  Mental status: Alert and oriented x 3  Speech: Clear, fluent  Cranial nerves: PERRLA, EOMI, face symmetric, with normal strength and sensation, tongue and palate midline, SCM 5/5 bilaterally  Motor:     RIGHT  Delt 5/5   Bic 5/5  Tri 5/5   HI 5/5    5/5  IP 5/5   Quad 5/5   Ham 5/5   AT 5/5   EHL 5/5 Syed 5/5     LEFT    Delt 5/5   Bic 5/5  Tri 5/5   HI 5/5    5/5  IP 5/5   Quad 5/5   Ham 5/5   AT 5/5   EHL 5/5 Syed 5/5   No pronator drift  Tone: Normal  Atrophy/Fasciculations: None  Sensation: Normal to light touch, symmetric, no neglect  Cerebellar: Normal finger nose finger  Gait: Normal, nondistressed heel toe tandem gait      Reflexes: 2+ throughout, symmetric, no Nicyk's  Fabers: Positive on right side.  Straight Leg Raise: Negative bilaterally.    IMAGING:  XR lumbar flexion-extension: Diffuse spondylotic changes throughout the lumbar spine, most pronounced at L3-4 and L4-5.  Grade 1 L4-5 spondylolisthesis without evidence of dynamic instabilities.    MRI lumbar 10/10/2024: Diffuse spondylotic changes throughout the lumbar spine, most pronounced at L3-4 and L4-5 with due to combination of degenerative disc disease, grade 1 L4-5 spondylolisthesis, there is moderate to severe right L3-4 foraminal stenosis, moderate bilateral L3-4 and L4-5 lateral recess stenosis.        ASSESSMENT:  Jim’s chronic low back pain appears largely mechanical and multifactorial, likely driven by a combination of disc degeneration, facet arthropathy, and potential segmental instability at L4-5. His continued partial benefit from injections supports a component of facet- or nerve-related pain. However, the pattern of pain exacerbation upon rising and positional changes raises concern for underlying instability and possible leg length discrepancy, contributing to muscle spasm and coronal imbalance. Though rheumatoid arthritis was a concern and he has pending work up, his clinical profile and symptom timing do  not strongly suggest an inflammatory arthropathy. He remains functional and motivated to stay active, but his discomfort and concerns about long-term progression warrant further diagnostic steps before considering possible reconstructive surgical interventions.    PLAN:  - Obtain long-leg standing X-rays (top to bottom) to accurately assess coronal alignment and evaluate for leg length discrepancy.  - Obtain a CT scan of the lumbar spine to further assess bony integrity and facilitate preoperative planning if surgical correction becomes necessary.  - Continue current exercise regimen focusing on low-impact aerobic activity and cautious core strengthening, while avoiding exercises that exacerbate low back pain.  - Maintain intermittent use of NSAIDs and acetaminophen as needed; advise caution with muscle relaxants to avoid sedation and overuse.  - Reevaluate following imaging to determine if corrective shoe lifts or orthotic interventions are indicated, and to assess candidacy for future spinal fusion based on alignment and ongoing symptoms.  - Consider surgical intervention (e.g., multi-level fusion) only if imaging confirms significant instability and/or persistent neurologic compromise that fails to respond to conservative measures.    Dickson Cao MD  Neurological Surgery    [address], [address]  [place] [address]  [phone]  Pager [phone]  2/27/2025 10:37 AM      This note was created using a voice-recognition transcribing system. Incorrect words or phrases may have been missed during proofreading. Please interpret accordingly.    Total Time    Established Patient Total Time       40  minutes.      Activities       Preparing to see the patient (chart/tests/imaging review).       Obtaining and/or reviewing separately obtained history.       Performing a medically appropriate examination and/or evaluation.       Counseling and educating the  patient/family/caregiver.       Ordering medications, tests, or procedures.       Referring and communicating with other health care professionals (when not separately reported).       Documenting clincal information in the electronic or other health record.       Independently interpreting results (not separately reported).    Communicating results to the patient/family/caregiver.    Care coordination (not separately reported).       [1] No Known Allergies  [2]   Current Outpatient Medications on File Prior to Visit   Medication Sig Dispense Refill    Esomeprazole Magnesium (NEXIUM) 40 MG Oral Capsule Delayed Release Take 1 capsule (40 mg total) by mouth daily. 90 capsule 1    cyclobenzaprine 10 MG Oral Tab Take 1 tablet (10 mg total) by mouth nightly. 30 tablet 0    doxazosin 8 MG Oral Tab Take 1 tablet (8 mg total) by mouth nightly. 90 tablet 2    ALLOPURINOL 300 MG Oral Tab TAKE 1 TABLET(300 MG) BY MOUTH DAILY 90 tablet 0    ALBUTEROL 108 (90 Base) MCG/ACT Inhalation Aero Soln INHALE 2 PUFFS INTO THE LUNGS EVERY 4 HOURS AS NEEDED 8.5 g 0    acetaminophen 500 MG Oral Tab Take 2 tablets (1,000 mg total) by mouth every 8 (eight) hours.      MAGNESIUM OR Take 1 tablet by mouth daily.      Multiple Vitamin (MULTI-VITAMIN) Oral Tab Take 1 tablet by mouth daily.      sennosides-docusate 8.6-50 MG Oral Tab Take 2 tablets by mouth 2 (two) times daily.      vitamin E 1000 UNITS Oral Cap Take 1 capsule (1,000 Units total) by mouth daily.      omega-3 fatty acids 1000 MG Oral Cap Take 1,000 mg by mouth daily.      diazePAM 5 MG Oral Tab Take 1 tablet (5 mg total) by mouth daily. 30 tablet 2    sildenafil 20 MG Oral Tab       azelastine 0.1 % Nasal Solution 1 spray by Nasal route 2 (two) times daily. 30 mL 2    aspirin 81 MG Oral Chew Tab Chew 1 tablet (81 mg total) by mouth daily.       No current facility-administered medications on file prior to visit.

## 2025-02-28 NOTE — PROGRESS NOTES
XR scoliosis and bilateral leg surveys 2/28/2025: Levoscoliosis with an apex centered around L3. PI 60.6, LL 51.7, SS 34.1 degrees. Positive SVA 6.5 cm. No significant leg length discrepancy. No pelvic obliquity.       Dickson Cao MD  Neurological Surgery    [address], [address] [place] [address] [phone]  Pager [phone]  2/28/2025 7:23 AM      This note was created using a voice-recognition transcribing system. Incorrect words or phrases may have been missed during proofreading. Please interpret accordingly.

## 2025-03-01 ENCOUNTER — HOSPITAL ENCOUNTER (OUTPATIENT)
Dept: CT IMAGING | Facility: HOSPITAL | Age: 75
Discharge: HOME | End: 2025-03-01
Attending: STUDENT IN AN ORGANIZED HEALTH CARE EDUCATION/TRAINING PROGRAM
Payer: MEDICARE

## 2025-03-01 DIAGNOSIS — M54.16 LUMBAR RADICULOPATHY: ICD-10-CM

## 2025-03-01 DIAGNOSIS — M47.816 LUMBAR SPONDYLOSIS: ICD-10-CM

## 2025-03-01 DIAGNOSIS — M48.061 STENOSIS OF LATERAL RECESS OF LUMBAR SPINE: ICD-10-CM

## 2025-03-01 DIAGNOSIS — M48.061 LUMBAR FORAMINAL STENOSIS: ICD-10-CM

## 2025-03-01 DIAGNOSIS — M53.2X6 SPINAL INSTABILITY OF LUMBAR REGION: ICD-10-CM

## 2025-03-01 DIAGNOSIS — M43.16 SPONDYLOLISTHESIS OF LUMBAR REGION: ICD-10-CM

## 2025-03-01 LAB
CCP IGG SERPL-ACNC: 1.3 U/ML (ref 0–6.9)
DSDNA IGG SERPL IA-ACNC: 1 IU/ML
ENA AB SER QL IA: 0.2 UG/L
ENA AB SER QL IA: NEGATIVE

## 2025-03-01 PROCEDURE — 72131 CT LUMBAR SPINE W/O DYE: CPT | Performed by: STUDENT IN AN ORGANIZED HEALTH CARE EDUCATION/TRAINING PROGRAM

## 2025-03-03 ENCOUNTER — TELEPHONE (OUTPATIENT)
Dept: PHYSICAL MEDICINE AND REHAB | Facility: CLINIC | Age: 75
End: 2025-03-03

## 2025-03-03 NOTE — TELEPHONE ENCOUNTER
S/w patient --   Pt inquiring as to what the next steps are for his plan. Pt states that his pain is now worse as it has been in the last 2-3 months. Pt wants to repeat ablation as he notes he has s/w people who have done this and have had more benefits than just completing 1. Informed pt that we do not typically repeat ablations prior to 6 months for safety and for insurance purposes.   Pt completed images as ordered by Dr. Cao. Plan per Dr. Cao's OV note: \"PLAN:  - Obtain long-leg standing X-rays (top to bottom) to accurately assess coronal alignment and evaluate for leg length discrepancy.  - Obtain a CT scan of the lumbar spine to further assess bony integrity and facilitate preoperative planning if surgical correction becomes necessary.  - Continue current exercise regimen focusing on low-impact aerobic activity and cautious core strengthening, while avoiding exercises that exacerbate low back pain.  - Maintain intermittent use of NSAIDs and acetaminophen as needed; advise caution with muscle relaxants to avoid sedation and overuse.  - Reevaluate following imaging to determine if corrective shoe lifts or orthotic interventions are indicated, and to assess candidacy for future spinal fusion based on alignment and ongoing symptoms.  - Consider surgical intervention (e.g., multi-level fusion) only if imaging confirms significant instability and/or persistent neurologic compromise that fails to respond to conservative measures.\"    Informed pt that since images are completed, to follow up with Dr. Cao's office to discuss these images/next steps. Pt verbalized understanding but would still like to follow up with Dr. Cason to determine what Dr. Casno thinks his next steps should be. Appt made for soonest appointment: 03/10/2025. Would like to be seen this week if possible.

## 2025-03-10 ENCOUNTER — OFFICE VISIT (OUTPATIENT)
Dept: SURGERY | Facility: CLINIC | Age: 75
End: 2025-03-10
Payer: MEDICARE

## 2025-03-10 ENCOUNTER — TELEPHONE (OUTPATIENT)
Dept: PHYSICAL MEDICINE AND REHAB | Facility: CLINIC | Age: 75
End: 2025-03-10

## 2025-03-10 ENCOUNTER — OFFICE VISIT (OUTPATIENT)
Dept: PHYSICAL MEDICINE AND REHAB | Facility: CLINIC | Age: 75
End: 2025-03-10
Payer: MEDICARE

## 2025-03-10 VITALS — WEIGHT: 215 LBS | HEIGHT: 71 IN | HEART RATE: 72 BPM | BODY MASS INDEX: 30.1 KG/M2 | OXYGEN SATURATION: 97 %

## 2025-03-10 VITALS — BODY MASS INDEX: 30.1 KG/M2 | HEIGHT: 71 IN | WEIGHT: 215 LBS

## 2025-03-10 DIAGNOSIS — M53.2X6 SPINAL INSTABILITY OF LUMBAR REGION: ICD-10-CM

## 2025-03-10 DIAGNOSIS — M48.061 LUMBAR FORAMINAL STENOSIS: Primary | ICD-10-CM

## 2025-03-10 DIAGNOSIS — M48.061 STENOSIS OF LATERAL RECESS OF LUMBAR SPINE: ICD-10-CM

## 2025-03-10 DIAGNOSIS — M54.50 CHRONIC RIGHT-SIDED LOW BACK PAIN WITHOUT SCIATICA: ICD-10-CM

## 2025-03-10 DIAGNOSIS — M47.816 LUMBAR SPONDYLOSIS: ICD-10-CM

## 2025-03-10 DIAGNOSIS — M54.16 LUMBAR RADICULOPATHY: ICD-10-CM

## 2025-03-10 DIAGNOSIS — M79.18 BUTTOCK PAIN: ICD-10-CM

## 2025-03-10 DIAGNOSIS — M53.3 SACROILIAC JOINT DYSFUNCTION OF RIGHT SIDE: Primary | ICD-10-CM

## 2025-03-10 DIAGNOSIS — M43.16 SPONDYLOLISTHESIS OF LUMBAR REGION: ICD-10-CM

## 2025-03-10 DIAGNOSIS — G89.29 CHRONIC RIGHT-SIDED LOW BACK PAIN WITHOUT SCIATICA: ICD-10-CM

## 2025-03-10 RX ORDER — LIDOCAINE HYDROCHLORIDE 10 MG/ML
8 INJECTION, SOLUTION INFILTRATION; PERINEURAL ONCE
Status: COMPLETED | OUTPATIENT
Start: 2025-03-10 | End: 2025-03-10

## 2025-03-10 RX ORDER — TRIAMCINOLONE ACETONIDE 40 MG/ML
40 INJECTION, SUSPENSION INTRA-ARTICULAR; INTRAMUSCULAR ONCE
Status: COMPLETED | OUTPATIENT
Start: 2025-03-10 | End: 2025-03-10

## 2025-03-10 RX ADMIN — LIDOCAINE HYDROCHLORIDE 8 ML: 10 INJECTION, SOLUTION INFILTRATION; PERINEURAL at 11:59:00

## 2025-03-10 RX ADMIN — TRIAMCINOLONE ACETONIDE 40 MG: 40 INJECTION, SUSPENSION INTRA-ARTICULAR; INTRAMUSCULAR at 12:00:00

## 2025-03-10 NOTE — PROGRESS NOTES
RETURN PATIENT VISIT    CHIEF COMPLAINT  Low back and right buttock pain     INTERVAL HISTORY  Jim Amin is a 74 year old who was last seen in clinic on 2/25/25.   History of Present Illness  The patient, with a history of chronic low back pain, presents with persistent pain despite multiple interventions over the past three to four months. The pain is localized to the lower back, described as a stabbing sensation, and is exacerbated by certain movements, including sexual activity and golfing. The patient reports temporary relief following numbing injections, but the pain returned. He also underwent acupuncture treatments, which provided temporary relief but the pain would return after about six weeks. The patient has been managing the pain with over-the-counter painkillers, but reports tarry stools after overmedication. He has been taking Tylenol 650mg every other day to manage the pain, but is concerned about potential stomach damage. The patient is seeking a more permanent solution to his pain, but is hesitant about surgical intervention.    REVIEW OF SYSTEMS  Review of systems was completed with the patient today as pertinent to today's visit    PHYSICAL EXAMINATION  CONSTITUTIONAL: Well-appearing, in no apparent distress  EYES: No scleral icterus or conjunctival hemorrhage  CARDIOVASCULAR: Skin warm and well-perfused, no peripheral edema  RESPIRATORY: Breathing unlabored without accessory muscle use  PSYCHIATRIC: Alert, cooperative, appropriate mood and affect  SKIN: No lesions or rashes on exposed skin  MUSCULOSKELETAL:   Physical Exam  Patient is tenderness palpation over the right sacroiliac joint with extension bias, mildly positive sacroiliac joint provocative maneuvers including Gaenslen's and AP thigh thrust.      REVIEW OF PRIOR X-RAYS/STUDIES  Independently reviewed the CT scan of the lumbar spine dated 3/1/2025 which reveals multifactorial degenerative changes with moderate spinal canal  stenosis at L3-4 as well as foraminal stenosis moderately at L2-3 and right-sided L3-4 and L4-5 bilaterally.    IMPRESSION/DIAGNOSIS  1.   Encounter Diagnoses   Name Primary?    Sacroiliac joint dysfunction of right side Yes    Lumbar radiculopathy     Lumbar spondylosis     Buttock pain     Chronic right-sided low back pain without sciatica          TREATMENT/PLAN  Assessment & Plan  Chronic Low Back Pain  Persistent pain despite multiple interventions including epidurals and medial branch blocks/radiofrequency ablation.  At today's visit however pain is localized to the sacroiliac joint and is exacerbated by certain movements, including sexual activity and golfing. Previous ablation was unsuccessful. Patient has been managing pain with intermittent use of Tylenol 650mg.  -Plan for sacroiliac joint injection today to assess for potential relief.  -Advise patient that he can take up to 1000mg of Tylenol three times a day as needed for pain control.    Follow-up  Patient to follow-up after sacroiliac joint injection to assess for pain relief.      Education was provided regarding the above impression/diagnosis and treatment options/plan were discussed.  All questions were answered during today's visit.  Patient will contact clinic if any other questions or concerns.          Parminder Cason DO  Interventional Spine and Sports Medicine Specialist   Physical Medicine and Rehabilitation  [facility]  [address] [place] [id number]    [address] [place] [address]

## 2025-03-10 NOTE — TELEPHONE ENCOUNTER
Per CMS Guidelines -no authorization is required for Right sacroiliac joint injection, ultrasound guidance.  CPT codes: 88088, 85988,   Completed in the office today.        Status: Authorization is not required based on medical necessity however is not a guarantee of payment and may be subject to review once claim is submitted.

## 2025-03-10 NOTE — PROGRESS NOTES
Patient returns to clinic for review of imaging; LOV 2/27/25;    Patient had injection this morning with Dr. Cason    Pain scale: 7/10  Dist of pain: denies N/T  Relief: off of everything, just tylenol prn

## 2025-03-10 NOTE — PROGRESS NOTES
[facility]  Neurological Surgery Established Patient Clinic Note    Jim Amin  8/9/1950  [id number]  PCP: Ab Campos MD  Referring Provider: Parminder Cason DO    REASON FOR VISIT:  Lumbar radiculopathy    HISTORY OF PRESENT ILLNESS 11/11/2024:  Jim Amin is a(n) 74 year old male who presents with a 10-year history of chronic lower back pain, initially triggered by an injury while moving furniture in [id number]. The pain is localized to the lower right back and radiates to the groin, buttocks, and lateral thigh. He describes the sensation as akin to having a \"rock in a bathtub\" and reports significant morning stiffness with difficulty rising from bed. The pain improves after sitting for about 20 minutes but is exacerbated by prolonged standing, sitting, and twisting movements.    Over the years, Jim has tried various treatments, including nerve ablation (approximately 8-9 years ago), acupuncture with electrostimulation, chiropractic manipulation, and physical therapy focused on myofascial release and balance. Acupuncture provided temporary relief lasting about a month per session. He enjoys golfing but has difficulty swinging due to pain and stiffness, requiring medications to play and often needing to stop after 12-13 holes due to inability to fully rotate his trunk.    He reports that stretching forward helps alleviate his pain, while backward extension exacerbates it. He denies any radiating pain down the legs, numbness, tingling, or weakness in the lower extremities. He also notes aching in the groin area upon waking, which improves with movement.    He had an MRI 10 years ago showing degenerative changes, and a recent MRI indicates worsening of these conditions. He is open to surgical intervention if it offers relief. He received an L5-S1 interlaminar epidural steroid injection on 10/28/2024, providing temporary 70-75% pain relief that lasted three days.  Current management includes as-needed diclofenac and Tylenol.    INTERVAL HISTORY 12/9/2024:  Since the patient’s last visit approximately one month ago, he reports a period of significantly reduced pain following the right L3 transforaminal epidural steroid injection. He experienced about two weeks of improved pain tolerance and reduced reliance on oral analgesics. Although low back soreness intermittently returns, it is notably less intense and more transient than before. He continues regular exercise, including strengthening and flexibility routines, and has successfully resumed most daily activities without requiring continuous pain medication. No new neurological deficits are reported, and he remains stable from a systemic standpoint.    INTERVAL HISTORY 2/27/2025:  Jim Amin returns for a follow-up approximately two and a half months after his last injection visit to discuss ongoing low back pain. He reports that the pain continues to be most severe upon rising from bed, often requiring him to walk hunched over initially until the muscles loosen. He describes persistent deep aching from his shoulders down to his knees, with notable tightness in the lower lumbar region. He has maintained a four-day-per-week exercise routine, including elliptical use and light resistance work, though he experiences post-exercise soreness that sometimes escalates later in the day. Notably, a prior medial branch ablation offered temporary, partial relief. Recent laboratory testing was ordered to rule out rheumatoid arthritis, though his clinical presentation remains more suggestive of a mechanical etiology rather than an inflammatory process. He denies any new or progressive neurological deficits.    INTERVAL HISTORY 3/10/2025:  Mr. Jim Amin returns approximately two weeks after his last visit to discuss ongoing lower back pain and review recent imaging. He reports receiving a right sacroiliac (SI) joint injection  earlier today, noting only mild, temporary relief so far but understands it may take a few days for full effect. He continues to experience a deep ache in the right lower lumbar region, predominantly without true radicular symptoms; no numbness, tingling, or weakness is reported. He remains active with walking and targeted back strengthening exercises, though he occasionally encounters post-exercise stiffness and pain flare-ups that subside with position changes or oral analgesics. He wishes to continue nonoperative measures but remains open to surgical intervention if his pain significantly worsens.    Summary of responses to injections:  10/28/2024 - L5-S1 Interlaminar Epidural Steroid Injection  Relief: Approximately 70-75% reduction in pain.  Duration: Lasted about 3 days before tapering off.  11/12/2024 - Right L3 Transforaminal Epidural Steroid Injection  Relief: Marked improvement in pain tolerance and decreased reliance on oral analgesics.  Duration: Persisted for about 2 weeks, after which symptoms gradually returned.  1/7/2025 - Bilateral L4-5 and L5-S1 Facet Joint Medial Branch Blocks  Purpose: Diagnostic blocks to assess facet-mediated pain and guide potential radiofrequency ablation (RFA).  Relief: The blocks provided some short-term diagnostic relief, identifying the facet joints as possible contributors to the pain.  1/21/2025 - Bilateral L4-5 and L5-S1 Medial Branch Radiofrequency Ablation  Outcome: Despite the diagnostic blocks showing facet involvement, the RFA did not yield lasting pain relief, and the patient’s axial back pain returned.  3/10/2025 - Right Sacroiliac (SI) Joint Injection  Immediate Response: Mild, temporary relief likely from the local anesthetic; steroid effect was still pending at the time of the last note.  Ongoing Assessment: The patient was advised it may take several days to  the full benefit of the SI joint injection.    PAST MEDICAL HISTORY:  Past Medical History:     Asthma (HCC)    Gout    Hx of motion sickness     Gout (on allopurinol)  Urinary issues (on doxazosin)  No history of hypertension, diabetes, or other chronic conditions reported    PAST SURGICAL HISTORY:  Past Surgical History:   Procedure Laterality Date    Colonoscopy N/A 2/20/2025    Procedure: COLONOSCOPY with polypectomy;  Surgeon: Carla Callahan MD;  Location: [facility] ENDOSCOPY    Inj paravert f jnt l/s 1 lev  6/18/2013    Procedure: LUMBAR FACET INJECTION OR MEDIAL BRANCH NERVE BLOCK;  Surgeon: Jairon Mejia MD;  [facility]    Inj paravert f jnt l/s 1 lev  7/3/2013    Procedure: LUMBAR FACET INJECTION OR MEDIAL BRANCH NERVE BLOCK;  Surgeon: Jairon Mejia MD;  Location: [facility]    Other surgical history     Status post shoulder replacement surgery    FAMILY HISTORY:  family history includes Diabetes in his father and mother.    SOCIAL HISTORY:   reports that he has never smoked. He has never used smokeless tobacco. He reports current alcohol use of about 40.0 standard drinks of alcohol per week. He reports that he does not use drugs.  Works in the [employer] industry; on his feet frequently  Exercises four days a week; enjoys walking and golfing  ; lives with his wife  Denies tobacco or illicit drug use    ALLERGIES:  Allergies[1]    MEDICATIONS:  Medications Ordered Prior to Encounter[2]    REVIEW OF SYSTEMS:  All other systems were reviewed and were negative except for those previously mentioned in the HPI    PHYSICAL EXAMINATION:  General: No acute distress.  Respiratory: Non-labored respirations bilaterally. No audible wheezing  Cardiovascular: Extremities warm and well-perfused.  Abdomen: Soft, nontender, nondistended.   Musculoskeletal: Moves all extremities well, symmetrically.  Extremities: No edema.  Back Examination:  Inspection: Normal spinal alignment; no visible deformities.  Palpation: Tenderness over the right lower  lumbar region and right sacroiliac (SI) joint area.  Range of Motion: Limited trunk rotation and extension due to pain; forward flexion provides some relief.    NEUROLOGIC EXAMINATION:  Mental status: Alert and oriented x 3  Speech: Clear, fluent  Cranial nerves: PERRLA, EOMI, face symmetric, with normal strength and sensation, tongue and palate midline, SCM 5/5 bilaterally  Motor:     RIGHT  Delt 5/5   Bic 5/5  Tri 5/5   HI 5/5    5/5  IP 5/5   Quad 5/5   Ham 5/5   AT 5/5   EHL 5/5 Syed 5/5     LEFT    Delt 5/5   Bic 5/5  Tri 5/5   HI 5/5    5/5  IP 5/5   Quad 5/5   Ham 5/5   AT 5/5   EHL 5/5 Syed 5/5   No pronator drift  Tone: Normal  Atrophy/Fasciculations: None  Sensation: Normal to light touch, symmetric, no neglect  Cerebellar: Normal finger nose finger  Gait: Normal, nondistressed heel toe tandem gait      Reflexes: 2+ throughout, symmetric, no Nicky's  Fabers: Positive on right side.  Straight Leg Raise: Negative bilaterally.    IMAGING:  XR lumbar flexion-extension: Diffuse spondylotic changes throughout the lumbar spine, most pronounced at L3-4 and L4-5.  Grade 1 L4-5 spondylolisthesis without evidence of dynamic instabilities.    MRI lumbar 10/10/2024: Diffuse spondylotic changes throughout the lumbar spine, most pronounced at L3-4 and L4-5 with due to combination of degenerative disc disease, grade 1 L4-5 spondylolisthesis, there is moderate to severe right L3-4 foraminal stenosis, moderate bilateral L3-4 and L4-5 lateral recess stenosis.      XR scoliosis and bilateral leg surveys 2/28/2025: Levoscoliosis with an apex centered around L3. PI 60.6, LL 51.7, SS 34.1 degrees. Positive SVA 6.5 cm. No significant leg length discrepancy. No pelvic obliquity.     CT Lumbar 3/1/2025: Confirms multifactorial degenerative findings. Moderate central canal stenosis at L3-4; severe left and moderate right foraminal stenosis at L3-4; additional levels of mild-to-moderate stenosis (L2-3, L4-5). Significant  facet arthropathy, notably on the right. No acute fractures. Bone density appears adequate for potential instrumented fusion if indicated.        ASSESSMENT:  Mr. Amin’s persistent but variably controlled lower back pain appears driven by a combination of degenerative disc disease, facet arthropathy, and mild segmental instability at L3-4 and L4-5. His pain is predominantly mechanical in nature, exacerbated by extension, and occasionally relieved by injections targeting the SI joint or facet joints, suggesting a multifactorial source. Recent imaging reinforces the presence of advanced degenerative changes and foraminal narrowing, especially at L3-4. While he remains functional with daily activities and exercises, continued symptoms could justify consideration of a two-level (L3-4, L4-5) posterior decompression and instrumented fusion if nonoperative measures fail. This represents an evolution from prior impressions, now supported by more extensive imaging showing severe foraminal stenosis and facet overgrowth on the right.    PLAN:  - Continue conservative management with low-impact aerobic exercise and core/back strengthening.  - Continue acetaminophen as needed; discussed safer anti-inflammatory options (e.g., celecoxib) with primary care to minimize GI risks.  - Assess response to today’s SI joint injection; if pain persists, consider further diagnostic injections or selective nerve root blocks to clarify pain generators.  - Revisit potential surgical intervention if back and/or radicular-type pain substantially worsens or fails to improve with conservative measures; patient to contact clinic sooner if symptoms escalate.  - Schedule a follow-up in approximately 3 months, or earlier if needed, to reevaluate pain control and revisit surgical considerations.    Dickson Cao MD  Neurological Surgery    [facility]  [address] [place] [address], [address]  [place], [place]  [address]  [phone]  Pager [phone]  3/10/2025 3:44 PM      This note was created using a voice-recognition transcribing system. Incorrect words or phrases may have been missed during proofreading. Please interpret accordingly.    Total Time    Established Patient Total Time       30  minutes.      Activities       Preparing to see the patient (chart/tests/imaging review).       Obtaining and/or reviewing separately obtained history.       Performing a medically appropriate examination and/or evaluation.       Counseling and educating the patient/family/caregiver.       Ordering medications, tests, or procedures.       Referring and communicating with other health care professionals (when not separately reported).       Documenting clincal information in the electronic or other health record.       Independently interpreting results (not separately reported).    Communicating results to the patient/family/caregiver.    Care coordination (not separately reported).         [1] No Known Allergies  [2]   Current Outpatient Medications on File Prior to Visit   Medication Sig Dispense Refill    cephALEXin 500 MG Oral Cap Take 1 capsule (500 mg total) by mouth 2 (two) times daily. (Patient not taking: Reported on 2/27/2025) 14 capsule 0    Esomeprazole Magnesium (NEXIUM) 40 MG Oral Capsule Delayed Release Take 1 capsule (40 mg total) by mouth daily. 90 capsule 1    cyclobenzaprine 10 MG Oral Tab Take 1 tablet (10 mg total) by mouth nightly. 30 tablet 0    doxazosin 8 MG Oral Tab Take 1 tablet (8 mg total) by mouth nightly. 90 tablet 2    ALLOPURINOL 300 MG Oral Tab TAKE 1 TABLET(300 MG) BY MOUTH DAILY 90 tablet 0    ALBUTEROL 108 (90 Base) MCG/ACT Inhalation Aero Soln INHALE 2 PUFFS INTO THE LUNGS EVERY 4 HOURS AS NEEDED 8.5 g 0    acetaminophen 500 MG Oral Tab Take 2 tablets (1,000 mg total) by mouth every 8 (eight) hours.      MAGNESIUM OR Take 1 tablet by mouth daily.      Multiple Vitamin (MULTI-VITAMIN) Oral Tab Take 1  tablet by mouth daily.      sennosides-docusate 8.6-50 MG Oral Tab Take 2 tablets by mouth 2 (two) times daily. (Patient not taking: Reported on 2/27/2025)      vitamin E 1000 UNITS Oral Cap Take 1 capsule (1,000 Units total) by mouth daily.      omega-3 fatty acids 1000 MG Oral Cap Take 1,000 mg by mouth daily.      diazePAM 5 MG Oral Tab Take 1 tablet (5 mg total) by mouth daily. 30 tablet 2    sildenafil 20 MG Oral Tab       azelastine 0.1 % Nasal Solution 1 spray by Nasal route 2 (two) times daily. 30 mL 2    aspirin 81 MG Oral Chew Tab Chew 1 tablet (81 mg total) by mouth daily.       No current facility-administered medications on file prior to visit.

## 2025-03-10 NOTE — PROCEDURES
[facility]  US guided Sacroiliac joint Injection Procedure Note     CHIEF COMPLAINT: Low back/ buttock pain     Procedure:  Ultrasound guided right sacroiliac joint injection     The risks, benefits and anticipated outcomes of the procedure, the risks and benefits of the alternatives to the procedure, and the roles and tasks of the personnel to be involved, were discussed with the patient, and the patient consents to the procedure and agrees to proceed.      The procedure was carried out under sterile prep.  A 27 ga 1.5 in needle was introduced and advanced with ultrasound guidance for skin anesthesia with 3 cc of 2% lidocaine.  Then, again with real time ultrasound guidance, a 3.5 inch 22 gauge needle was advanced to this ilium just inferior and medial to the PSIS.   Following negative aspiration, a mixture of 3 cc of 1% lidocaine and 1 cc of Kenalog (40mg/ml) was injected.       Ultrasound interpretation was performed prior to the procedure to identify the target and any adjacent neurovascular structures.  Subsequently, interpretation was performed during real-time needle guidance confirming placement.  Post-intervention interpretation was also performed confirming appropriate injectate flow and hemostasis. The patient tolerated the procedure without complication and was instructed in post-procedure precautions.          Parminder Cason DO  Physical Medicine and Rehabilitation / Sports Medicine   [facility]

## 2025-03-18 DIAGNOSIS — F41.9 ANXIETY: ICD-10-CM

## 2025-03-18 RX ORDER — DIAZEPAM 5 MG/1
5 TABLET ORAL DAILY
Qty: 30 TABLET | Refills: 2 | Status: SHIPPED | OUTPATIENT
Start: 2025-03-18

## 2025-03-25 ENCOUNTER — OFFICE VISIT (OUTPATIENT)
Age: 75
End: 2025-03-25
Payer: MEDICARE

## 2025-03-25 VITALS
OXYGEN SATURATION: 94 % | SYSTOLIC BLOOD PRESSURE: 114 MMHG | HEIGHT: 71 IN | DIASTOLIC BLOOD PRESSURE: 70 MMHG | HEART RATE: 76 BPM | BODY MASS INDEX: 30 KG/M2

## 2025-03-25 DIAGNOSIS — F41.9 ANXIETY: ICD-10-CM

## 2025-03-25 DIAGNOSIS — I71.21 ANEURYSM OF ASCENDING AORTA WITHOUT RUPTURE: ICD-10-CM

## 2025-03-25 DIAGNOSIS — M47.816 LUMBAR SPONDYLOSIS: Primary | ICD-10-CM

## 2025-03-25 DIAGNOSIS — M53.3 SACROILIAC JOINT DYSFUNCTION OF RIGHT SIDE: ICD-10-CM

## 2025-03-25 DIAGNOSIS — K92.1 MELENA: ICD-10-CM

## 2025-03-25 PROCEDURE — 99214 OFFICE O/P EST MOD 30 MIN: CPT | Performed by: INTERNAL MEDICINE

## 2025-03-25 PROCEDURE — G2211 COMPLEX E/M VISIT ADD ON: HCPCS | Performed by: INTERNAL MEDICINE

## 2025-03-25 NOTE — PROGRESS NOTES
The following individual(s) verbally consented to be recorded using ambient AI listening technology and understand that they can each withdraw their consent to this listening technology at any point by asking the clinician to turn off or pause the recording: YES    Patient name: Jim Amin  Additional names:

## 2025-03-25 NOTE — PROGRESS NOTES
Jim Amin male 74 year old       Chief complaint:     History of Present Illness  The patient, with a history of gout, prostate issues, and melanoma, presents with chronic back pain, specifically in the sacroiliac joint. The pain is severe, often reaching a level of 7 or 8 on a scale of 10, and is particularly debilitating after physical activities such as golf. The patient reports that the pain is worst in the morning, causing him to walk hunched over, but improves after about half an hour. The patient has been seeing multiple specialists, including a neurologist and a cardiologist, and has undergone various treatments, including cortisone shots and physical therapy. The most recent cortisone shot into the sacroiliac joint has provided significant relief, reducing the severity and duration of the pain episodes.    In addition to the back pain, the patient also reports a history of shoulder pain. The right shoulder has been replaced, and the left shoulder is currently being managed with cortisone shots due to arthritis. The most recent shot has significantly reduced the pain, improving the patient's sleep quality.    The patient also mentions a history of gout, which is being managed with allopurinol, and prostate issues, which are being managed with doxazosin. The patient reports that he is unable to urinate without the doxazosin. The patient also has a history of melanoma, for which he is taking a daily baby aspirin.           Patient Active Problem List   Diagnosis    Lumbar spondylosis    DDD (degenerative disc disease), lumbar    Aortic insufficiency    BPH (benign prostatic hyperplasia)    Carotid artery stenosis    Gout    Thoracic aortic aneurysm    Personal history of atrial flutter    History of cardiac radiofrequency ablation    Mild intermittent asthma without complication (HCC)    Anxiety    Chronic right shoulder pain    History of hematuria    Colon cancer screening    Polyp of colon     Internal hemorrhoids        Current Outpatient Medications   Medication Sig Dispense Refill    diazePAM 5 MG Oral Tab TAKE 1 TABLET(5 MG) BY MOUTH DAILY 30 tablet 2    Esomeprazole Magnesium (NEXIUM) 40 MG Oral Capsule Delayed Release Take 1 capsule (40 mg total) by mouth daily. 90 capsule 1    doxazosin 8 MG Oral Tab Take 1 tablet (8 mg total) by mouth nightly. 90 tablet 2    ALLOPURINOL 300 MG Oral Tab TAKE 1 TABLET(300 MG) BY MOUTH DAILY 90 tablet 0    ALBUTEROL 108 (90 Base) MCG/ACT Inhalation Aero Soln INHALE 2 PUFFS INTO THE LUNGS EVERY 4 HOURS AS NEEDED 8.5 g 0    acetaminophen 500 MG Oral Tab Take 2 tablets (1,000 mg total) by mouth every 8 (eight) hours.      MAGNESIUM OR Take 1 tablet by mouth daily.      Multiple Vitamin (MULTI-VITAMIN) Oral Tab Take 1 tablet by mouth daily.      vitamin E 1000 UNITS Oral Cap Take 1 capsule (1,000 Units total) by mouth daily.      omega-3 fatty acids 1000 MG Oral Cap Take 1,000 mg by mouth daily.      sildenafil 20 MG Oral Tab       azelastine 0.1 % Nasal Solution 1 spray by Nasal route 2 (two) times daily. 30 mL 2    aspirin 81 MG Oral Chew Tab Chew 1 tablet (81 mg total) by mouth daily.          Vitals:    03/25/25 1015   BP: 114/70   Pulse: 76   VITALSBody mass index is 29.99 kg/m².  Physical Exam  CHEST: Lungs clear to auscultation.  CARDIOVASCULAR: Heart sounds regular.  MUSCULOSKELETAL: Tenderness in right sacroiliac area.              Jim was seen today for follow - up.    Diagnoses and all orders for this visit:    Lumbar spondylosis    Sacroiliac joint dysfunction of right side    Melena  -     CBC With Differential With Platelet; Future    Aneurysm of ascending aorta without rupture  -     Basic Metabolic Panel (8); Future    Anxiety       Assessment & Plan  Lumbar Spondylosis with Degenerative Disc Disease  Chronic lower back pain in the lumbar region, specifically L3, L4, and L5, with a degenerated disc and another deteriorating disc. Pain is managed with  conservative measures, including physical therapy and exercises. No radicular symptoms are present. Surgery is not recommended due to potential complications and current pain management efficacy.  - Continue physical therapy and back strengthening exercises, including pull downs and good mornings.  - Avoid surgery unless pain becomes unmanageable.  - Follow up with Dr. Cason for further management.    Sacroiliac Joint Dysfunction  Pain localized to the right sacroiliac joint, exacerbated by activities such as golfing. A recent cortisone injection provided significant relief, indicating the sacroiliac joint as a primary pain source.  - Monitor response to cortisone injection.  - Follow up with Dr. Cason for further management.    Left Shoulder Arthritis  Chronic pain in the left shoulder with significant arthritis. A recent injection provided relief, indicating inflammation as a pain source. No rotator cuff tear present. Future shoulder replacement may be considered if pain becomes unmanageable.  - Consider future shoulder replacement if pain becomes unmanageable.    Thoracic Aortic Aneurysm  Scheduled follow-up CT scan in May to monitor the aneurysm. Kidney function test will be performed prior to CT scan to ensure safety of contrast use.  - Order follow-up CT scan in May.  - Perform kidney function test prior to CT scan.    Melena  Previous episode of melena likely due to NSAID overuse. No further episodes reported since discontinuation of NSAIDs. Negative workup for autoimmune or inflammatory disease. A baseline CBC is needed to check for anemia due to previous melena.  - Order baseline CBC to check for anemia.  - Continue esomeprazole for at least one month.    Gout  Uric acid levels are normal, indicating gout is well-managed.  - Continue allopurinol 300 mg daily.    Benign Prostatic Hyperplasia  Experiences difficulty urinating without medication. Doxazosin is effective in managing symptoms.  - Continue  doxazosin 8 mg daily.    Anxiety  Mild anxiety managed with occasional use of diazepam. He reports using it rarely, only when feeling overwhelmed.  - Continue diazepam as needed for anxiety.    General Health Maintenance  Compliant with aspirin therapy for coronary artery calcifications. No recent issues with PSA levels.  - Continue aspirin 81 mg daily.  - Monitor PSA levels as needed.    Follow-up  Plan for follow-up appointments and tests to monitor ongoing conditions and response to treatments.  - Follow up in July for re-evaluation of conditions.  - Ensure CT scan and CBC are completed in May.         This note was prepared using Dragon [employer] voice recognition dictation software and as a result, errors may occur. When identified, these errors have been corrected. While every attempt is made to correct errors during dictation, discrepancies may still exist

## 2025-03-27 RX ORDER — ALBUTEROL SULFATE 90 UG/1
2 INHALANT RESPIRATORY (INHALATION) EVERY 4 HOURS PRN
Qty: 8.5 G | Refills: 0 | Status: SHIPPED | OUTPATIENT
Start: 2025-03-27

## 2025-03-31 ENCOUNTER — OFFICE VISIT (OUTPATIENT)
Dept: PHYSICAL MEDICINE AND REHAB | Facility: CLINIC | Age: 75
End: 2025-03-31
Payer: MEDICARE

## 2025-03-31 VITALS — HEIGHT: 71 IN | WEIGHT: 215 LBS | BODY MASS INDEX: 30.1 KG/M2

## 2025-03-31 DIAGNOSIS — M53.3 SACROILIAC JOINT DYSFUNCTION OF RIGHT SIDE: ICD-10-CM

## 2025-03-31 DIAGNOSIS — M79.18 BUTTOCK PAIN: ICD-10-CM

## 2025-03-31 DIAGNOSIS — M47.816 LUMBAR SPONDYLOSIS: Primary | ICD-10-CM

## 2025-03-31 PROCEDURE — 99214 OFFICE O/P EST MOD 30 MIN: CPT | Performed by: PHYSICAL MEDICINE & REHABILITATION

## 2025-03-31 RX ORDER — TIZANIDINE 2 MG/1
2 TABLET ORAL NIGHTLY
Qty: 30 TABLET | Refills: 0 | Status: SHIPPED | OUTPATIENT
Start: 2025-03-31 | End: 2025-03-31

## 2025-03-31 RX ORDER — DICLOFENAC SODIUM 75 MG/1
75 TABLET, DELAYED RELEASE ORAL 2 TIMES DAILY
Qty: 60 TABLET | Refills: 0 | Status: SHIPPED | OUTPATIENT
Start: 2025-03-31

## 2025-03-31 NOTE — PROGRESS NOTES
The following individual(s) verbally consented to be recorded using ambient AI listening technology and understand that they can each withdraw their consent to this listening technology at any point by asking the clinician to turn off or pause the recording:    Patient name: Jim Amin  Additional names:  M S

## 2025-03-31 NOTE — PROGRESS NOTES
RETURN PATIENT VISIT    CHIEF COMPLAINT  Axial low back    INTERVAL HISTORY  Jim Amin is a 74 year old who was last seen in clinic on 3/10/2025 following up after right sacroiliac joint injection.  History of Present Illness  The patient, with a history of chronic back pain and muscle spasms, presents for a follow-up visit. He reports that his symptoms have been somewhat alleviated by a recent injection to his right-sided sacroiliac joint, but he continues to experience pain, particularly in the morning. The pain is described as a stabbing sensation that is triggered by certain movements or activities, such as lifting heavy objects. Despite the pain, the patient is able to function and engage in physical activities, including golfing and exercising. He has been diligent in performing the exercises recommended by his doctor, including resistance training and stretching. The patient also reports that he has been avoiding taking pain medications, opting instead for occasional Tylenol. He expresses concern about the potential need for back surgery and the impact it may have on his lifestyle.  He feels as though there is a large muscular spasm component to his pain, previously did not have success with cyclobenzaprine.    REVIEW OF SYSTEMS  Review of systems was completed with the patient today as pertinent to today's visit    PHYSICAL EXAMINATION  CONSTITUTIONAL: Well-appearing, in no apparent distress  EYES: No scleral icterus or conjunctival hemorrhage  CARDIOVASCULAR: Skin warm and well-perfused, no peripheral edema  RESPIRATORY: Breathing unlabored without accessory muscle use  PSYCHIATRIC: Alert, cooperative, appropriate mood and affect  SKIN: No lesions or rashes on exposed skin  MUSCULOSKELETAL:   Good range of motion of the lumbar spine with flexion and extension, mild exacerbation of axial low back complaints with rotation as well as extension, similar to previous visits.    REVIEW OF PRIOR  X-RAYS/STUDIES  No new imaging to review    IMPRESSION/DIAGNOSIS  1.   Encounter Diagnoses   Name Primary?    Lumbar spondylosis Yes    Buttock pain     Sacroiliac joint dysfunction of right side          TREATMENT/PLAN  Assessment & Plan  Chronic right-sided low back pain without sciatica  He experiences chronic right-sided low back pain, exacerbated by certain movements or lifting, described as a stabbing sensation that subsides quickly. Severe morning back spasms improve after movement and exercise.  He is actively engaging in exercises to strengthen the back in preparation for potential future surgery.   - Prescribe diclofenac for pain management.  - Try a different muscle relaxer to manage spasms, to be taken at night initially.    Sacroiliac joint dysfunction of right side  Sacroiliac joint dysfunction on the right side contributes to his chronic low back pain. Previous injections provided temporary relief, but the pain persists with certain activities. Discussed the challenges of managing this condition and the potential need for ongoing medication management.  - Continue current exercise regimen to strengthen the back.  - Consider future interventions if pain persists.          Education was provided regarding the above impression/diagnosis and treatment options/plan were discussed.  All questions were answered during today's visit.  Patient will contact clinic if any other questions or concerns.          Parminder Cason DO  Interventional Spine and Sports Medicine Specialist   Physical Medicine and Rehabilitation  [address] [address]    [address] [place] [address]

## 2025-05-20 ENCOUNTER — TELEPHONE (OUTPATIENT)
Dept: SURGERY | Facility: CLINIC | Age: 75
End: 2025-05-20

## 2025-05-20 NOTE — TELEPHONE ENCOUNTER
Medicare initial evaluation/ plan of care notes received via fax. Endorsed to provider for review and signature.     Location: [facility]  Clinician: Monty Arriaga, Doctor of PT  DOS: 5/13/25  Phone: [phone]  Fax: [phone]

## 2025-05-28 ENCOUNTER — OFFICE VISIT (OUTPATIENT)
Dept: SURGERY | Facility: CLINIC | Age: 75
End: 2025-05-28
Payer: MEDICARE

## 2025-05-28 VITALS
DIASTOLIC BLOOD PRESSURE: 82 MMHG | HEART RATE: 65 BPM | SYSTOLIC BLOOD PRESSURE: 142 MMHG | BODY MASS INDEX: 30 KG/M2 | WEIGHT: 215 LBS

## 2025-05-28 DIAGNOSIS — M48.061 LUMBAR FORAMINAL STENOSIS: Primary | ICD-10-CM

## 2025-05-28 DIAGNOSIS — M53.2X6 SPINAL INSTABILITY OF LUMBAR REGION: ICD-10-CM

## 2025-05-28 DIAGNOSIS — M47.816 LUMBAR SPONDYLOSIS: ICD-10-CM

## 2025-05-28 DIAGNOSIS — M48.061 STENOSIS OF LATERAL RECESS OF LUMBAR SPINE: ICD-10-CM

## 2025-05-28 DIAGNOSIS — M43.16 SPONDYLOLISTHESIS OF LUMBAR REGION: ICD-10-CM

## 2025-05-28 DIAGNOSIS — M54.16 LUMBAR RADICULOPATHY: ICD-10-CM

## 2025-05-28 PROCEDURE — 99214 OFFICE O/P EST MOD 30 MIN: CPT | Performed by: STUDENT IN AN ORGANIZED HEALTH CARE EDUCATION/TRAINING PROGRAM

## 2025-05-28 NOTE — PROGRESS NOTES
[facility]  Neurological Surgery Established Patient Clinic Note    Jim Amin  8/9/1950  [id number]  PCP: Ab Campos MD  Referring Provider: Parminder Cason DO    REASON FOR VISIT:  Persistent right low-back and sacroiliac (SI) joint pain interfering with activity and golf    HISTORY OF PRESENT ILLNESS 11/11/2024:  Jim Amin is a(n) 74 year old male who presents with a 10-year history of chronic lower back pain, initially triggered by an injury while moving furniture in [id number]. The pain is localized to the lower right back and radiates to the groin, buttocks, and lateral thigh. He describes the sensation as akin to having a \"rock in a bathtub\" and reports significant morning stiffness with difficulty rising from bed. The pain improves after sitting for about 20 minutes but is exacerbated by prolonged standing, sitting, and twisting movements.    Over the years, Jim has tried various treatments, including nerve ablation (approximately 8-9 years ago), acupuncture with electrostimulation, chiropractic manipulation, and physical therapy focused on myofascial release and balance. Acupuncture provided temporary relief lasting about a month per session. He enjoys golfing but has difficulty swinging due to pain and stiffness, requiring medications to play and often needing to stop after 12-13 holes due to inability to fully rotate his trunk.    He reports that stretching forward helps alleviate his pain, while backward extension exacerbates it. He denies any radiating pain down the legs, numbness, tingling, or weakness in the lower extremities. He also notes aching in the groin area upon waking, which improves with movement.    He had an MRI 10 years ago showing degenerative changes, and a recent MRI indicates worsening of these conditions. He is open to surgical intervention if it offers relief. He received an L5-S1 interlaminar epidural steroid injection on  10/28/2024, providing temporary 70-75% pain relief that lasted three days. Current management includes as-needed diclofenac and Tylenol.    INTERVAL HISTORY 12/9/2024:  Since the patient’s last visit approximately one month ago, he reports a period of significantly reduced pain following the right L3 transforaminal epidural steroid injection. He experienced about two weeks of improved pain tolerance and reduced reliance on oral analgesics. Although low back soreness intermittently returns, it is notably less intense and more transient than before. He continues regular exercise, including strengthening and flexibility routines, and has successfully resumed most daily activities without requiring continuous pain medication. No new neurological deficits are reported, and he remains stable from a systemic standpoint.    INTERVAL HISTORY 2/27/2025:  Jim Amin returns for a follow-up approximately two and a half months after his last injection visit to discuss ongoing low back pain. He reports that the pain continues to be most severe upon rising from bed, often requiring him to walk hunched over initially until the muscles loosen. He describes persistent deep aching from his shoulders down to his knees, with notable tightness in the lower lumbar region. He has maintained a four-day-per-week exercise routine, including elliptical use and light resistance work, though he experiences post-exercise soreness that sometimes escalates later in the day. Notably, a prior medial branch ablation offered temporary, partial relief. Recent laboratory testing was ordered to rule out rheumatoid arthritis, though his clinical presentation remains more suggestive of a mechanical etiology rather than an inflammatory process. He denies any new or progressive neurological deficits.    INTERVAL HISTORY 3/10/2025:  Mr. Jim Amin returns approximately two weeks after his last visit to discuss ongoing lower back pain and review  recent imaging. He reports receiving a right sacroiliac (SI) joint injection earlier today, noting only mild, temporary relief so far but understands it may take a few days for full effect. He continues to experience a deep ache in the right lower lumbar region, predominantly without true radicular symptoms; no numbness, tingling, or weakness is reported. He remains active with walking and targeted back strengthening exercises, though he occasionally encounters post-exercise stiffness and pain flare-ups that subside with position changes or oral analgesics. He wishes to continue nonoperative measures but remains open to surgical intervention if his pain significantly worsens.    INTERVAL HISTORY 5/28/2025:  Jim Amin returns today earlier than scheduled because his right-sided low-back/SI pain has resurged despite ongoing physical therapy at [facility] and nightly diclofenac. He reports transient relief for ~1 month after the March right-sided SI-joint steroid injection but the “knife-like” ache has recurred, flaring with trunk extension, golf swings, bar-stool sitting, and prolonged standing. Forward flexion and sitting in a chair ease symptoms; walking the golf course causes deep lumbar soreness that abates once seated. Daily home program now includes bridges, glute-activation drills, clamshells, and core stabilization; he notes improved flexibility (can rotate trunk further) but pain intensity is unchanged. He denies true radicular pain, numbness, weakness, or bowel/bladder changes. No new medications or systemic symptoms.    Summary of responses to injections:  10/28/2024 - L5-S1 Interlaminar Epidural Steroid Injection  Relief: Approximately 70-75% reduction in pain.  Duration: Lasted about 3 days before tapering off.  11/12/2024 - Right L3 Transforaminal Epidural Steroid Injection  Relief: Marked improvement in pain tolerance and decreased reliance on oral analgesics.  Duration: Persisted for about 2  weeks, after which symptoms gradually returned.  1/7/2025 - Bilateral L4-5 and L5-S1 Facet Joint Medial Branch Blocks  Purpose: Diagnostic blocks to assess facet-mediated pain and guide potential radiofrequency ablation (RFA).  Relief: The blocks provided some short-term diagnostic relief, identifying the facet joints as possible contributors to the pain.  1/21/2025 - Bilateral L4-5 and L5-S1 Medial Branch Radiofrequency Ablation  Outcome: Despite the diagnostic blocks showing facet involvement, the RFA did not yield lasting pain relief, and the patient’s axial back pain returned.  3/10/2025 - Right Sacroiliac (SI) Joint Injection  Immediate Response: Mild, temporary relief likely from the local anesthetic; steroid effect was still pending at the time of the last note.  Ongoing Assessment: The patient was advised it may take several days to  the full benefit of the SI joint injection.    PAST MEDICAL HISTORY:  Past Medical History:    Asthma (HCC)    Gout    Hx of motion sickness     Gout (on allopurinol)  Urinary issues (on doxazosin)  No history of hypertension, diabetes, or other chronic conditions reported    PAST SURGICAL HISTORY:  Past Surgical History:   Procedure Laterality Date    Colonoscopy N/A 2/20/2025    Procedure: COLONOSCOPY with polypectomy;  Surgeon: Carla Callahan MD;  Location: [facility] ENDOSCOPY    Inj paravert f jnt l/s 1 lev 6/18/2013    Procedure: LUMBAR FACET INJECTION OR MEDIAL BRANCH NERVE BLOCK;  Surgeon: Jairon Mejia MD;  Location: [facility]    Inj paravert f jnt l/s 1 lev  7/3/2013    Procedure: LUMBAR FACET INJECTION OR MEDIAL BRANCH NERVE BLOCK;  Surgeon: Jairon Mejia MD;  Location: [facility]IN [facility]    Other surgical history     Status post shoulder replacement surgery    FAMILY HISTORY:  family history includes Diabetes in his father and mother.    SOCIAL HISTORY:   reports that he has never smoked. He has never used smokeless  tobacco. He reports current alcohol use of about 40.0 standard drinks of alcohol per week. He reports that he does not use drugs.  Works in the restaurant industry; on his feet frequently  Exercises four days a week; enjoys walking and golfing  ; lives with his wife  Denies tobacco or illicit drug use    ALLERGIES:  Allergies[1]    MEDICATIONS:  Medications Ordered Prior to Encounter[2]    REVIEW OF SYSTEMS:  All other systems were reviewed and were negative except for those previously mentioned in the HPI    PHYSICAL EXAMINATION:  General: No acute distress.  Respiratory: Non-labored respirations bilaterally. No audible wheezing  Cardiovascular: Extremities warm and well-perfused.  Abdomen: Soft, nontender, nondistended.   Musculoskeletal: Moves all extremities well, symmetrically.  Extremities: No edema.  Back Examination:  Inspection: Normal spinal alignment; no visible deformities.  Palpation: Tenderness over the right lower lumbar region and right sacroiliac (SI) joint area.  Range of Motion: Limited trunk rotation and extension due to pain; forward flexion provides some relief.    NEUROLOGIC EXAMINATION:  Mental status: Alert and oriented x 3  Speech: Clear, fluent  Cranial nerves: PERRLA, EOMI, face symmetric, with normal strength and sensation, tongue and palate midline, SCM 5/5 bilaterally  Motor:     RIGHT  Delt 5/5   Bic 5/5  Tri 5/5   HI 5/5    5/5  IP 5/5   Quad 5/5   Ham 5/5   AT 5/5   EHL 5/5 Syed 5/5     LEFT    Delt 5/5   Bic 5/5  Tri 5/5   HI 5/5    5/5  IP 5/5   Quad 5/5   Ham 5/5   AT 5/5   EHL 5/5 Syed 5/5   No pronator drift  Tone: Normal  Atrophy/Fasciculations: None  Sensation: Normal to light touch, symmetric, no neglect  Cerebellar: Normal finger nose finger  Gait: Normal, nondistressed heel toe tandem gait      Reflexes: 2+ throughout, symmetric, no Nicky's  Fabers: Positive on right side.  Straight Leg Raise: Negative bilaterally.    Right SI joint region tender to deep  palpation; trunk rotation and extension limited by pain. Forward flexion reproduces stretch discomfort yet partially relieves SI ache. Neurologic exam unchanged from prior visit: full strength and symmetric reflexes in all extremities, intact sensation, normal gait.    IMAGING:  XR lumbar flexion-extension: Diffuse spondylotic changes throughout the lumbar spine, most pronounced at L3-4 and L4-5.  Grade 1 L4-5 spondylolisthesis without evidence of dynamic instabilities.    MRI lumbar 10/10/2024: Diffuse spondylotic changes throughout the lumbar spine, most pronounced at L3-4 and L4-5 with due to combination of degenerative disc disease, grade 1 L4-5 spondylolisthesis, there is moderate to severe right L3-4 foraminal stenosis, moderate bilateral L3-4 and L4-5 lateral recess stenosis.      XR scoliosis and bilateral leg surveys 2/28/2025: Levoscoliosis with an apex centered around L3. PI 60.6, LL 51.7, SS 34.1 degrees. Positive SVA 6.5 cm. No significant leg length discrepancy. No pelvic obliquity.     CT Lumbar 3/1/2025: Confirms multifactorial degenerative findings. Moderate central canal stenosis at L3-4; severe left and moderate right foraminal stenosis at L3-4; additional levels of mild-to-moderate stenosis (L2-3, L4-5). Significant facet arthropathy, notably on the right. No acute fractures. Bone density appears adequate for potential instrumented fusion if indicated.        ASSESSMENT:  Mr. Amin is a 74-year-old male with chronic mechanical low-back pain driven by multilevel lumbar spondylosis, right-sided facet arthropathy, and functional SI-joint dysfunction likely exacerbated by lower-extremity asymmetry. Despite dedicated PT and one SI-joint injection, pain recurs with rotational loading; neurologic status remains stable. Non-operative care is still appropriate while we investigate possible leg-length discrepancy and enhance pelvic stability.    PLAN:  - Refer to podiatry for formal leg-length  evaluation and custom orthotic assessment.  - Continue daily core and glute-strengthening program; add broom-stick resisted isometric maneuver (“self-pop” SI-mobilization) as demonstrated, pending PT approval.  - Continue nightly diclofenac 75 mg; limit total NSAID use to <=10 consecutive days per month; supplement with acetaminophen <=3 g/day.  - Begin topical diclofenac (Voltaren) to right SI/lumbar paraspinals 30 min before golf or prolonged standing.  - Re-evaluate benefit of March SI-joint injection at next visit; if inadequate and orthotic trial fails, discuss repeat diagnostic SI-block versus radiofrequency denervation.  - Encourage low-impact aerobic conditioning (elliptical, walking) and avoid provocative hyper-extension during golf warm-up.  - Follow up in clinic in 3 months or sooner for worsening pain, new neurologic deficit, or inability to maintain activities of daily living.    Dickson Cao MD  Neurological Surgery    [address], [address] [place] [address]  [phone]  Pager [phone]  5/28/2025 9:46 AM      This note was created using a voice-recognition transcribing system. Incorrect words or phrases may have been missed during proofreading. Please interpret accordingly.    Total Time    Established Patient Total Time       30  minutes.      Activities       Preparing to see the patient (chart/tests/imaging review).       Obtaining and/or reviewing separately obtained history.       Performing a medically appropriate examination and/or evaluation.       Counseling and educating the patient/family/caregiver.       Ordering medications, tests, or procedures.       Referring and communicating with other health care professionals (when not separately reported).       Documenting clincal information in the electronic or other health record.       Independently interpreting results (not separately reported).    Communicating results to  the patient/family/caregiver.    Care coordination (not separately reported).         [1] No Known Allergies  [2]   Current Outpatient Medications on File Prior to Visit   Medication Sig Dispense Refill    diclofenac 75 MG Oral Tab EC Take 1 tablet (75 mg total) by mouth 2 (two) times daily. 60 tablet 0    tiZANidine 4 MG Oral Tab Take 1 tablet (4 mg total) by mouth nightly. 30 tablet 0    ALBUTEROL 108 (90 Base) MCG/ACT Inhalation Aero Soln INHALE 2 PUFFS INTO THE LUNGS EVERY 4 HOURS AS NEEDED 8.5 g 0    diazePAM 5 MG Oral Tab TAKE 1 TABLET(5 MG) BY MOUTH DAILY 30 tablet 2    Esomeprazole Magnesium (NEXIUM) 40 MG Oral Capsule Delayed Release Take 1 capsule (40 mg total) by mouth daily. 90 capsule 1    doxazosin 8 MG Oral Tab Take 1 tablet (8 mg total) by mouth nightly. 90 tablet 2    ALLOPURINOL 300 MG Oral Tab TAKE 1 TABLET(300 MG) BY MOUTH DAILY 90 tablet 0    acetaminophen 500 MG Oral Tab Take 2 tablets (1,000 mg total) by mouth every 8 (eight) hours.      MAGNESIUM OR Take 1 tablet by mouth daily.      Multiple Vitamin (MULTI-VITAMIN) Oral Tab Take 1 tablet by mouth daily.      vitamin E 1000 UNITS Oral Cap Take 1 capsule (1,000 Units total) by mouth daily.      omega-3 fatty acids 1000 MG Oral Cap Take 1,000 mg by mouth daily.      sildenafil 20 MG Oral Tab       azelastine 0.1 % Nasal Solution 1 spray by Nasal route 2 (two) times daily. 30 mL 2    aspirin 81 MG Oral Chew Tab Chew 1 tablet (81 mg total) by mouth daily.       No current facility-administered medications on file prior to visit.

## 2025-05-28 NOTE — PROGRESS NOTES
Patient returns to clinic to discuss options regarding his present lumbar spine pain. LOV with Dr. Cason on 3/31/2025 in which he was prescribed Diclofenac and a different muscle relaxer for spasms. Patient had SI joint injection about 3 months ago; and he felt this helped for a couple months and now it is wearing off.     Right-sided pain low back pain; he also mentions this is mainly the SI joint   Meds: Diclofenac 1x at night, Tylenol prn  PT: just started, he states this is going well but does not feel it is helping his issue      The following individual(s) verbally consented to be recorded using ambient AI listening technology and understand that they can each withdraw their consent to this listening technology at any point by asking the clinician to turn off or pause the recording:    Patient name: Jim Fongnarciso

## 2025-05-29 RX ORDER — ALLOPURINOL 300 MG/1
300 TABLET ORAL DAILY
Qty: 90 TABLET | Refills: 0 | Status: SHIPPED | OUTPATIENT
Start: 2025-05-29

## 2025-05-30 ENCOUNTER — TELEPHONE (OUTPATIENT)
Facility: CLINIC | Age: 75
End: 2025-05-30

## 2025-05-30 NOTE — TELEPHONE ENCOUNTER
[facility] LMOVM FOR PT TO SCHEDULE US. Please transfer to [phone] if patient calls main line. Thanks!

## 2025-06-09 ENCOUNTER — TELEPHONE (OUTPATIENT)
Dept: PHYSICAL MEDICINE AND REHAB | Facility: CLINIC | Age: 75
End: 2025-06-09

## 2025-06-09 ENCOUNTER — OFFICE VISIT (OUTPATIENT)
Dept: PHYSICAL MEDICINE AND REHAB | Facility: CLINIC | Age: 75
End: 2025-06-09
Payer: MEDICARE

## 2025-06-09 VITALS — BODY MASS INDEX: 30.1 KG/M2 | WEIGHT: 215 LBS | HEIGHT: 71 IN

## 2025-06-09 DIAGNOSIS — M53.3 SACROILIAC JOINT DYSFUNCTION OF RIGHT SIDE: Primary | ICD-10-CM

## 2025-06-09 RX ORDER — LIDOCAINE HYDROCHLORIDE 10 MG/ML
6 INJECTION, SOLUTION INFILTRATION; PERINEURAL ONCE
Status: COMPLETED | OUTPATIENT
Start: 2025-06-09 | End: 2025-06-09

## 2025-06-09 RX ORDER — TRIAMCINOLONE ACETONIDE 40 MG/ML
40 INJECTION, SUSPENSION INTRA-ARTICULAR; INTRAMUSCULAR ONCE
Status: COMPLETED | OUTPATIENT
Start: 2025-06-09 | End: 2025-06-09

## 2025-06-09 RX ADMIN — LIDOCAINE HYDROCHLORIDE 6 ML: 10 INJECTION, SOLUTION INFILTRATION; PERINEURAL at 13:18:00

## 2025-06-09 RX ADMIN — TRIAMCINOLONE ACETONIDE 40 MG: 40 INJECTION, SUSPENSION INTRA-ARTICULAR; INTRAMUSCULAR at 13:18:00

## 2025-06-09 NOTE — PROGRESS NOTES
RETURN PATIENT VISIT    CHIEF COMPLAINT  Posterior pelvic pain    INTERVAL HISTORY  Jim Amin is a 74 year old who was last seen in clinic on 3/31/2025, at that visit patient underwent a right-sided sacroiliac joint injection which provided him with significant pain for about 2-1/2 months.  Patient states that his pain has been returning, he is improving with stretching regimen however  History of Present Illness  Jim Amin is a 74 year old male who presents with persistent sacroiliac joint pain.    He experiences persistent sacroiliac joint pain, worsened by activities such as golfing. A previous injection provided near complete relief for six weeks, but the pain has returned slowly, described as a 'knife in the back' during a golf swing. Despite the pain, he continues golfing and stretching exercises.    He attends physical therapy at [facility] and follows a 20-minute morning stretching routine, which has expanded from five to seven exercises. He notes significant improvement over the past three weeks due to stretching.    For pain management, he takes diclofenac as needed, particularly before activities like golfing, with a dose the night before and another the morning of the activity, followed by Tylenol. This regimen has improved his pain recovery time and reduced the need for additional doses post-activity. He ensures diclofenac is taken with food to avoid gastrointestinal side effects.    A recent intense stretching session resulted in a 'teeny pop' in the sacroiliac joint, which he found relieving. He continues to explore ways to manage his pain through stretching and medication.      REVIEW OF SYSTEMS  Review of systems was completed with the patient today as pertinent to today's visit    PHYSICAL EXAMINATION  CONSTITUTIONAL: Well-appearing, in no apparent distress  EYES: No scleral icterus or conjunctival hemorrhage  CARDIOVASCULAR: Skin warm and well-perfused, no peripheral  edema  RESPIRATORY: Breathing unlabored without accessory muscle use  PSYCHIATRIC: Alert, cooperative, appropriate mood and affect  SKIN: No lesions or rashes on exposed skin  MUSCULOSKELETAL:   Physical Exam  He has tenderness palpation of the right sacroiliac joint with positive AP thigh thrust, no new musculoskeletal left femoral deficits, stable from previous visits.      REVIEW OF PRIOR X-RAYS/STUDIES  No new imaging to review    IMPRESSION/DIAGNOSIS  1.   Encounter Diagnosis   Name Primary?    Sacroiliac joint dysfunction of right side Yes         TREATMENT/PLAN  Assessment & Plan  Sacroiliac joint dysfunction, right side  Chronic dysfunction exacerbated by activities like golf. Previous injection effective for six weeks. Improved with stretching and diclofenac. Pain managed with stretching, diclofenac, and acetaminophen.  - Administer another injection for right sacroiliac joint.  - Continue stretching routine as advised by physical therapist.  - Use diclofenac as needed, especially before activities like golf.  - Continue acetaminophen as needed for pain.    Chronic back pain and muscle spasms  Chronic pain and spasms well-managed with current treatment. Stretching routine effective. No current use of muscle relaxants.  - Continue stretching routine for pain and spasm management.  - Avoid muscle relaxants unless necessary.      Education was provided regarding the above impression/diagnosis and treatment options/plan were discussed.  All questions were answered during today's visit.  Patient will contact clinic if any other questions or concerns.          Parminder Cason DO  Interventional Spine and Sports Medicine Specialist   Physical Medicine and Rehabilitation  [address] [address]    [address] [place] [address]

## 2025-06-09 NOTE — TELEPHONE ENCOUNTER
Per CMS Guidelines- no authorization is required for Right Sacroiliac Joint Injection USG.   CPT: 40230,   Dx: M53.3    Status: authorization is not required based on medical necessity however may by subject to review once claim is submitted.     Injection done in office.

## 2025-06-09 NOTE — PROCEDURES
[facility]  US guided Sacroiliac joint Injection Procedure Note     CHIEF COMPLAINT: Low back/ buttock pain     Procedure:  Ultrasound guided right sacroiliac joint injection     The risks, benefits and anticipated outcomes of the procedure, the risks and benefits of the alternatives to the procedure, and the roles and tasks of the personnel to be involved, were discussed with the patient, and the patient consents to the procedure and agrees to proceed.     The procedure was carried out under sterile prep.  A 27 ga 1.5 in needle was introduced and advanced with ultrasound guidance for skin anesthesia with 3 cc of 2% lidocaine.  Then, again with real time ultrasound guidance, a 3.5 inch 22 gauge needle was advanced to this ilium just inferior and medial to the PSIS.   Following negative aspiration, a mixture of 3 cc of 1% lidocaine and 1 cc of Kenalog (40mg/ml) was injected.       Ultrasound interpretation was performed prior to the procedure to identify the target and any adjacent neurovascular structures.  Subsequently, interpretation was performed during real-time needle guidance confirming placement.  Post-intervention interpretation was also performed confirming appropriate injectate flow and hemostasis. The patient tolerated the procedure without complication and was instructed in post-procedure precautions.          Parminder Cason DO  Physical Medicine and Rehabilitation / Sports Medicine   [facility]

## 2025-06-18 ENCOUNTER — TELEPHONE (OUTPATIENT)
Dept: PHYSICAL MEDICINE AND REHAB | Facility: CLINIC | Age: 75
End: 2025-06-18

## 2025-06-20 RX ORDER — DICLOFENAC SODIUM 75 MG/1
75 TABLET, DELAYED RELEASE ORAL 2 TIMES DAILY
Qty: 60 TABLET | Refills: 0 | Status: SHIPPED | OUTPATIENT
Start: 2025-06-20

## 2025-06-20 NOTE — TELEPHONE ENCOUNTER
Refill Request    LOV:6/9/2025 Parminder Cason DO   Due back to clinic per last office note:  not mentioned.   NOV: 9/8/2025 Parminder Cason DO     Urine drug screen (if applicable): n/a  Pain contract: n/a     Medication request:   Requested Prescriptions     Pending Prescriptions Disp Refills    diclofenac 75 MG Oral Tab EC 60 tablet 0     Sig: Take 1 tablet (75 mg total) by mouth 2 (two) times daily.           [place]: 5/10/25 #60 - 30 days supply    LOV plan (if weaning or changing medications): Per Dr. Cason, \"- Use diclofenac as needed, especially before activities like golf.\"

## 2025-08-20 RX ORDER — ALLOPURINOL 300 MG/1
300 TABLET ORAL DAILY
Qty: 90 TABLET | Refills: 1 | Status: SHIPPED | OUTPATIENT
Start: 2025-08-20

## 2025-08-22 RX ORDER — DICLOFENAC SODIUM 75 MG/1
75 TABLET, DELAYED RELEASE ORAL 2 TIMES DAILY
Qty: 60 TABLET | Refills: 0 | Status: SHIPPED | OUTPATIENT
Start: 2025-08-22

## (undated) DEVICE — GIJAW SINGLE-USE BIOPSY FORCEPS WITH NEEDLE: Brand: GIJAW

## (undated) DEVICE — 60 ML SYRINGE REGULAR TIP: Brand: MONOJECT

## (undated) DEVICE — V2 SPECIMEN COLLECTION MANIFOLD KIT: Brand: NEPTUNE

## (undated) DEVICE — KIT ENDO ORCAPOD 160/180/190

## (undated) DEVICE — KIT CLEAN ENDOKIT 1.1OZ GOWNX2

## (undated) DEVICE — Device

## (undated) NOTE — LETTER
AUTHORIZATION FOR SURGICAL OPERATION OR OTHER PROCEDURE    1. I hereby authorize Dr. Parminder Cason and the [facility] staff assigned to my case to perform the following operation and/or procedure at the [facility]:    Right sacroiliac joint injection, ultrasound guidance, local anesthesia       2.  My physician has explained the nature and purpose of the operation or other procedure, possible alternative methods of treatment, the risks involved, and the possibility of complication to me.  I acknowledge that no guarantee has been made as to the result that may be obtained.  3.  I recognize that, during the course of this operation, or other procedure, unforseen conditions may necessitate additional or different procedure than those listed above.  I, therefore, further authorize and request that the above named physician, his/her physician assistants or designees perform such procedures as are, in his/her professional opinion, necessary and desirable.  4.  Any tissue or organs removed in the operation or other procedure may be disposed of by and at the discretion of the [facility] staff and [facility].  5.  I understand that in the event of a medical emergency, I will be transported by local paramedics to [facility] or other hospital emergency department.  6.  I certify that I have read and fully understand the above consent to operation and/or other procedure.    7.  I acknowledge that my physician has explained sedation/analgesia administration to me including the risks and benefits.  I consent to the administration of sedation/analgesia as may be necessary or desirable in the judgement of my physician.    Witness signature: ___________________________________________________ Date:  ______/______/_____                    Time:  ________ A.M.  P.MLavinia Amin  8/9/1950  [id number]         Patient signature:   ___________________________________________________                 Statement of Physician  My signature below affirms that prior to the time of the procedure, I have explained to the patient and/or his/her guardian, the risks and benefits involved in the proposed treatment and any reasonable alternative to the proposed treatment.  I have also explained the risks and benefits involved in the refusal of the proposed treatment and have answered the patient's questions.                        Date:  ______/______/_______  Provider                      Signature:  __________________________________________________________       Time:  ___________ ASTEPHANIE VALDES

## (undated) NOTE — LETTER
KELLY. Notifier: [employer]. Patient Name: Jim Amin Identification Number: [id number]                          Advance Beneficiary Notice of Noncoverage (ABN)   NOTE:  If Medicare doesn’t pay for D. item/service(s) below, you may have to pay.  Medicare does not pay for everything, even some care that you or your health care provider have good reason to think you need. We expect Medicare may not pay for the D. item/service(s) below.  D. Items or Services   Right sided SIJ injection ultrasound guidance, local anesthesia.    E. Reason Medicare May Not Pay: F. Estimated Cost   __ EKG ($87.00)  __ Pap smear ($101) __Pelvic/Breast ($147.00)  __ Ear Irrigation ($138)  _x_ Injection(s)  ___ Tdap ($181)       ___ Meningitis ($290)   __Prevnar ($555)  ___ Td ($66)              ___ Prevnar 20 ($549)  ___ Hep A ($152)     ___ Prolia ($1827)         __ Xiaflex ($            )   ___ Hep B ($150)     ___ Pneumovax ($287)                                         ___ Vaccine Administration ($65)   __ Medicare does not cover this service      __ Medicare may not pay for this   item/service for your condition     __ Medicare may not pay for this item/service as often as this        WHAT YOU NEED TO DO NOW:  Read this notice, so you can make an informed decision about your care.  Ask us any questions that you may have after you finish reading.  Choose an option below about whether to receive the D. item/service(s) listed above.  Note: If you choose Option 1 or 2, we may help you to use any other insurance that you might have, but Medicare cannot require us to do this.  G. OPTIONS: Check only one box.  We cannot choose a box for you.   OPTION 1. I want the D. item/service(s) listed above. You may ask to be paid now, but I also want Medicare billed for an official decision on payment, which is sent to me on a Medicare Summary Notice (MSN). I understand that if Medicare doesn’t pay, I am responsible for payment,  but I can appeal to Medicare by following the directions on the MSN. If Medicare does pay, you will refund any payments I made to you, less co-pays or deductibles.  OPTION 2. I want the D. item/service(s) listed above, but do not bill Medicare. You may ask to be paid now as I am responsible for payment. I cannot appeal if Medicare is not billed.  OPTION 3. I don't want the D. item/service(s) listed above. I understand with this choice I am not responsible for payment, and I cannot appeal to see if Medicare would pay.    H. Additional Information:    This notice gives our opinion, not an official Medicare decision. If you have other questions on this notice or Medicare billing, call 1-800-MEDICARE (1-136.961.7396/TTY: 1-234.572.2303). Signing below means that you have received and understand this notice. You also receive a copy.  I. Signature: J. Date:       You have the right to get Medicare information in an accessible format, like large print, Braille, or audio. You also have the right to file a complaint if you feel you’ve been discriminated against. Visit Medicare.gov/about- us/ugrakjbaegnpw-ucypftrjtkvfrddzw-bejkvv.  According to the Paperwork Reduction Act of 1995, no persons are required to respond to a collection of information unless it displays a valid OMB control number. The valid OMB control number for this information collection is 5156-5976. The time required to complete this information collection is estimated to average 7 minutes per response, including the time to review instructions, search existing data resources, gather the data needed, and complete and review the information collection. If you have comments concerning the accuracy of the time estimate or suggestions for improving this form, please write to: CMS, [address]     [place], Attn: GABRIELLE Reports Clearance Officer, [place], [place] [address].  Form CMS-R-131 (Exp. 1/31/2026) Form Approved OMB No. [id number]

## (undated) NOTE — LETTER
[facility] [employer]AN for Torrey Diehl     : 1950     Date: 2021  Provider:  Kya Hunter MD  Phone for doctor or clinic: [address] [facility]  [address], [facility] [address]  [id number] [facility] [id number]

## (undated) NOTE — LETTER
[place] Notifier: [employer]. Patient Name: Jim Amin Identification Number: [id number]                          Advance Beneficiary Notice of Noncoverage (ABN)   NOTE:  If Medicare doesn’t pay for D. item/service(s) below, you may have to pay.  Medicare does not pay for everything, even some care that you or your health care provider have good reason to think you need. We expect Medicare may not pay for the D. item/service(s) below.  D. Items or Services E. Reason Medicare May Not Pay: F. Estimated Cost   Right sacroiliac joint injection, ultrasound guidance, local anesthesia      __ Medicare does not cover this service      __ Medicare may not pay for this   item/service for your condition     __ Medicare may not pay for this item/service as often as this        WHAT YOU NEED TO DO NOW:  Read this notice, so you can make an informed decision about your care.  Ask us any questions that you may have after you finish reading.  Choose an option below about whether to receive the D. item/service(s) listed above.  Note: If you choose Option 1 or 2, we may help you to use any other insurance that you might have, but Medicare cannot require us to do this.  G. OPTIONS: Check only one box.  We cannot choose a box for you.   OPTION 1. I want the D. item/service(s) listed above. You may ask to be paid now, but I also want Medicare billed for an official decision on payment, which is sent to me on a Medicare Summary Notice (MSN). I understand that if Medicare doesn’t pay, I am responsible for payment, but I can appeal to Medicare by following the directions on the MSN. If Medicare does pay, you will refund any payments I made to you, less co-pays or deductibles.  OPTION 2. I want the D. item/service(s) listed above, but do not bill Medicare. You may ask to be paid now as I am responsible for payment. I cannot appeal if Medicare is not billed.  OPTION 3. I don't want the D. item/service(s) listed  above. I understand with this choice I am not responsible for payment, and I cannot appeal to see if Medicare would pay.    H. Additional Information:    This notice gives our opinion, not an official Medicare decision. If you have other questions on this notice or Medicare billing, call 1-800-MEDICARE ([phone]/TTY: [phone]). Signing below means that you have received and understand this notice. You also receive a copy.  I. Signature: J. Date:       You have the right to get Medicare information in an accessible format, like large print, Braille, or audio. You also have the right to file a complaint if you feel you’ve been discriminated against. Visit Medicare.gov/about- us/nicswldixwzoc-bndznzoaeepvicwby-lnlkws.  According to the Paperwork Reduction Act of 1995, no persons are required to respond to a collection of information unless it displays a valid OMB control number. The valid OMB control number for this information collection is [phone]. The time required to complete this information collection is estimated to average 7 minutes per response, including the time to review instructions, search existing data resources, gather the data needed, and complete and review the information collection. If you have comments concerning the accuracy of the time estimate or suggestions for improving this form, please write to: CMS, [address]     [place], Attn: GABRIELLE Reports Clearance Officer, [place], [place] [address].  Form CMS-R-131 (Exp. 1/31/2026) Form Approved OMB No. [id number]

## (undated) NOTE — LETTER
AUTHORIZATION FOR SURGICAL OPERATION OR OTHER PROCEDURE    1. I hereby authorize Dr. Parminder Cason and the [facility] staff assigned to my case to perform the following operation and/or procedure at the [facility]:     Right sided SIJ injection ultrasound guidance, local anesthesia.     2.  My physician has explained the nature and purpose of the operation or other procedure, possible alternative methods of treatment, the risks involved, and the possibility of complication to me.  I acknowledge that no guarantee has been made as to the result that may be obtained.  3.  I recognize that, during the course of this operation, or other procedure, unforseen conditions may necessitate additional or different procedure than those listed above.  I, therefore, further authorize and request that the above named physician, his/her physician assistants or designees perform such procedures as are, in his/her professional opinion, necessary and desirable.  4.  Any tissue or organs removed in the operation or other procedure may be disposed of by and at the discretion of the [facility] staff and [facility].  5.  I understand that in the event of a medical emergency, I will be transported by local paramedics to [facility] or other hospital emergency department.  6.  I certify that I have read and fully understand the above consent to operation and/or other procedure.    7.  I acknowledge that my physician has explained sedation/analgesia administration to me including the risks and benefits.  I consent to the administration of sedation/analgesia as may be necessary or desirable in the judgement of my physician.    Witness signature: ___________________________________________________ Date:  ______/______/_____                    Time:  ________ A.M.  P.M.       Patient Name:  Jim Amin  8/9/1950  [id number]         Patient signature:   ___________________________________________________        Statement of Physician  My signature below affirms that prior to the time of the procedure, I have explained to the patient and/or his/her guardian, the risks and benefits involved in the proposed treatment and any reasonable alternative to the proposed treatment.  I have also explained the risks and benefits involved in the refusal of the proposed treatment and have answered the patient's questions.                        Date:  ______/______/_______  Provider                      Signature:  __________________________________________________________       Time:  ___________ ASTEPHANIE VALDES

## (undated) NOTE — LETTER
[facility]  [address], [place], [place]    Authorization for Surgical Operation and Procedure                               I hereby authorize Carla Callahan MD, my physician and his/her assistants (if applicable), which may include medical students, residents, and/or fellows, to perform the following surgical operation/ procedure and administer such anesthesia as may be determined necessary by my physician: Operation/Procedure name (s) COLONOSCOPY on Jimnomi Amin   2.   I recognize that during the surgical operation/procedure, unforeseen conditions may necessitate additional or different procedures than those listed above.  I, therefore, further authorize and request that the above-named surgeon, assistants, or designees perform such procedures as are, in their judgment, necessary and desirable.    3.   My surgeon/physician has discussed prior to my surgery the potential benefits, risks and side effects of this procedure; the likelihood of achieving goals; and potential problems that might occur during recuperation.  They also discussed reasonable alternatives to the procedure, including risks, benefits, and side effects related to the alternatives and risks related to not receiving this procedure.  I have had all my questions answered and I acknowledge that no guarantee has been made as to the result that may be obtained.    4.   Should the need arise during my operation/procedure, which includes change of level of care prior to discharge, I also consent to the administration of blood and/or blood products.  Further, I understand that despite careful testing and screening of blood or blood products by collecting agencies, I may still be subject to ill effects as a result of receiving a blood transfusion and/or blood products.  The following are some, but not all, of the potential risks that can occur: fever and allergic reactions, hemolytic reactions, transmission of  diseases such as Hepatitis, AIDS and Cytomegalovirus (CMV) and fluid overload.  In the event that I wish to have an autologous transfusion of my own blood, or a directed donor transfusion, I will discuss this with my physician.  Check only if Refusing Blood or Blood Products  I understand refusal of blood or blood products as deemed necessary by my physician may have serious consequences to my condition to include possible death. I hereby assume responsibility for my refusal and release the hospital, its personnel, and my physicians from any responsibility for the consequences of my refusal.    o  Refuse   5.   I authorize the use of any specimen, organs, tissues, body parts or foreign objects that may be removed from my body during the operation/procedure for diagnosis, research or teaching purposes and their subsequent disposal by hospital authorities.  I also authorize the release of specimen test results and/or written reports to my treating physician on the hospital medical staff or other referring or consulting physicians involved in my care, at the discretion of the Pathologist or my treating physician.    6.   I consent to the photographing or videotaping of the operations or procedures to be performed, including appropriate portions of my body for medical, scientific, or educational purposes, provided my identity is not revealed by the pictures or by descriptive texts accompanying them.  If the procedure has been photographed/videotaped, the surgeon will obtain the original picture, image, videotape or CD.  The hospital will not be responsible for storage, release or maintenance of the picture, image, tape or CD.    7.   I consent to the presence of a  or observers in the operating room as deemed necessary by my physician or their designees.    8.   I recognize that in the event my procedure results in extended X-Ray/fluoroscopy time, I may develop a skin reaction.    9. If I have a Do Not  Attempt Resuscitation (DNAR) order in place, that status will be suspended while in the operating room, procedural suite, and during the recovery period unless otherwise explicitly stated by me (or a person authorized to consent on my behalf). The surgeon or my attending physician will determine when the applicable recovery period ends for purposes of reinstating the DNAR order.  10. Patients having a sterilization procedure: I understand that if the procedure is successful the results will be permanent and it will therefore be impossible for me to inseminate, conceive, or bear children.  I also understand that the procedure is intended to result in sterility, although the result has not been guaranteed.   11. I acknowledge that my physician has explained sedation/analgesia administration to me including the risk and benefits I consent to the administration of sedation/analgesia as may be necessary or desirable in the judgment of my physician.    I CERTIFY THAT I HAVE READ AND FULLY UNDERSTAND THE ABOVE CONSENT TO OPERATION and/or OTHER PROCEDURE.     ____________________________________  _________________________________        ______________________________  Signature of Patient    Signature of Responsible Person                Printed Name of Responsible Person                                      ____________________________________  _____________________________                ________________________________  Signature of Witness        Date  Time         Relationship to Patient    STATEMENT OF PHYSICIAN My signature below affirms that prior to the time of the procedure; I have explained to the patient and/or his/her legal representative, the risks and benefits involved in the proposed treatment and any reasonable alternative to the proposed treatment. I have also explained the risks and benefits involved in refusal of the proposed treatment and alternatives to the proposed treatment and have answered the  patient's questions. If I have a significant financial interest in a co-management agreement or a significant financial interest in any product or implant, or other significant relationship used in this procedure/surgery, I have disclosed this and had a discussion with my patient.     _____________________________________________________              _____________________________  (Signature of Physician)                                                                                         (Date)                                   (Time)  Patient Name: Jim MARIE Brennan      : 1950      Printed: 2025     Medical Record #: [id number]                                      Page 1 of 1

## (undated) NOTE — LETTER
[facility]  Administration of Anesthesia  MARCJim agree to be cared for by a physician anesthesiologist alone and/or with a nurse anesthetist, who is specially trained to monitor me and give me medicine to put me to sleep or keep me comfortable during my procedure    I understand that my anesthesiologist and/or anesthetist is not an employee or agent of [facility] or [employer]. He or she works for [facility]    As the patient asking for anesthesia services, I agree to:  Allow the anesthesiologist (anesthesia doctor) to give me medicine and do additional procedures as necessary. Some examples are: Starting or using an “IV” to give me medicine, fluids or blood during my procedure, and having a breathing tube placed to help me breathe when I’m asleep (intubation). In the event that my heart stops working properly, I understand that my anesthesiologist will make every effort to sustain my life, unless otherwise directed by [facility] Do Not Resuscitate documents.  Tell my anesthesia doctor before my procedure:  If I am pregnant.  The last time that I ate or drank.  iii. All of the medicines I take (including prescriptions, herbal supplements, and pills I can buy without a prescription (including street drugs/illegal medications). Failure to inform my anesthesiologist about these medicines may increase my risk of anesthetic complications.  iv.If I am allergic to anything or have had a reaction to anesthesia before.  I understand how the anesthesia medicine will help me (benefits).  I understand that with any type of anesthesia medicine there are risks:  The most common risks are: nausea, vomiting, sore throat, muscle soreness, damage to my eyes, mouth, or teeth (from breathing tube placement).  Rare risks include: remembering what happened during my procedure, allergic reactions to medications, injury to my airway, heart, lungs, vision, nerves, or  muscles and in extremely rare instances death.  My doctor has explained to me other choices available to me for my care (alternatives).  Pregnant Patients (“epidural”):  I understand that the risks of having an epidural (medicine given into my back to help control pain during labor), include itching, low blood pressure, difficulty urinating, headache or slowing of the baby’s heart. Very rare risks include infection, bleeding, seizure, irregular heart rhythms and nerve injury.  Regional Anesthesia (“spinal”, “epidural”, & “nerve blocks”):  I understand that rare but potential complications include headache, bleeding, infection, seizure, irregular heart rhythms, and nerve injury.    _____________________________________________________________________________  Patient (or Representative) Signature/Relationship to Patient  Date   Time    _____________________________________________________________________________   Name (if used)    Language/Organization   Time    _____________________________________________________________________________  Nurse Anesthetist Signature     Date   Time  _____________________________________________________________________________  Anesthesiologist Signature     Date   Time  I have discussed the procedure and information above with the patient (or patient’s representative) and answered their questions. The patient or their representative has agreed to have anesthesia services.    _____________________________________________________________________________  Witness        Date   Time  I have verified that the signature is that of the patient or patient’s representative, and that it was signed before the procedure  Patient Name: Jim Amin     : 1950                 Printed: 2025 at 8:27 AM    Medical Record #: [id number]                                            Page 1 of 1  ----------ANESTHESIA CONSENT----------